# Patient Record
Sex: FEMALE | Race: WHITE | NOT HISPANIC OR LATINO | Employment: UNEMPLOYED | URBAN - METROPOLITAN AREA
[De-identification: names, ages, dates, MRNs, and addresses within clinical notes are randomized per-mention and may not be internally consistent; named-entity substitution may affect disease eponyms.]

---

## 2018-02-14 ENCOUNTER — HOSPITAL ENCOUNTER (EMERGENCY)
Facility: HOSPITAL | Age: 38
Discharge: HOME | End: 2018-02-14
Payer: MEDICAID

## 2018-02-14 VITALS
HEART RATE: 71 BPM | TEMPERATURE: 98 F | DIASTOLIC BLOOD PRESSURE: 70 MMHG | RESPIRATION RATE: 18 BRPM | SYSTOLIC BLOOD PRESSURE: 98 MMHG | OXYGEN SATURATION: 100 % | WEIGHT: 139 LBS

## 2018-02-14 DIAGNOSIS — R11.0 NAUSEA: ICD-10-CM

## 2018-02-14 DIAGNOSIS — R10.11 RUQ PAIN: Primary | ICD-10-CM

## 2018-02-14 LAB
ALBUMIN SERPL BCP-MCNC: 4.3 G/DL
ALP SERPL-CCNC: 69 U/L
ALT SERPL W/O P-5'-P-CCNC: 9 U/L
ANION GAP SERPL CALC-SCNC: 11 MMOL/L
AST SERPL-CCNC: 15 U/L
B-HCG UR QL: NEGATIVE
BASOPHILS # BLD AUTO: 0.02 K/UL
BASOPHILS NFR BLD: 0.3 %
BILIRUB SERPL-MCNC: 1.2 MG/DL
BILIRUB UR QL STRIP: NEGATIVE
BUN SERPL-MCNC: 7 MG/DL
CALCIUM SERPL-MCNC: 9.3 MG/DL
CHLORIDE SERPL-SCNC: 105 MMOL/L
CLARITY UR: CLEAR
CO2 SERPL-SCNC: 24 MMOL/L
COLOR UR: YELLOW
CREAT SERPL-MCNC: 0.8 MG/DL
DIFFERENTIAL METHOD: ABNORMAL
EOSINOPHIL # BLD AUTO: 0 K/UL
EOSINOPHIL NFR BLD: 0.6 %
ERYTHROCYTE [DISTWIDTH] IN BLOOD BY AUTOMATED COUNT: 14.7 %
EST. GFR  (AFRICAN AMERICAN): >60 ML/MIN/1.73 M^2
EST. GFR  (NON AFRICAN AMERICAN): >60 ML/MIN/1.73 M^2
GLUCOSE SERPL-MCNC: 104 MG/DL
GLUCOSE UR QL STRIP: NEGATIVE
HCT VFR BLD AUTO: 38.5 %
HGB BLD-MCNC: 12.8 G/DL
HGB UR QL STRIP: ABNORMAL
KETONES UR QL STRIP: NEGATIVE
LEUKOCYTE ESTERASE UR QL STRIP: NEGATIVE
LIPASE SERPL-CCNC: 17 U/L
LYMPHOCYTES # BLD AUTO: 2 K/UL
LYMPHOCYTES NFR BLD: 30.2 %
MCH RBC QN AUTO: 28.3 PG
MCHC RBC AUTO-ENTMCNC: 33.2 G/DL
MCV RBC AUTO: 85 FL
MICROSCOPIC COMMENT: ABNORMAL
MONOCYTES # BLD AUTO: 0.6 K/UL
MONOCYTES NFR BLD: 8.5 %
NEUTROPHILS # BLD AUTO: 3.9 K/UL
NEUTROPHILS NFR BLD: 60.4 %
NITRITE UR QL STRIP: NEGATIVE
PH UR STRIP: 6 [PH] (ref 5–8)
PLATELET # BLD AUTO: 340 K/UL
PMV BLD AUTO: 10.5 FL
POTASSIUM SERPL-SCNC: 3.7 MMOL/L
PROT SERPL-MCNC: 7.5 G/DL
PROT UR QL STRIP: NEGATIVE
RBC # BLD AUTO: 4.52 M/UL
RBC #/AREA URNS HPF: 20 /HPF (ref 0–4)
SODIUM SERPL-SCNC: 140 MMOL/L
SP GR UR STRIP: 1.01 (ref 1–1.03)
URN SPEC COLLECT METH UR: ABNORMAL
UROBILINOGEN UR STRIP-ACNC: NEGATIVE EU/DL
WBC # BLD AUTO: 6.46 K/UL
WBC #/AREA URNS HPF: 1 /HPF (ref 0–5)

## 2018-02-14 PROCEDURE — 99284 EMERGENCY DEPT VISIT MOD MDM: CPT | Mod: 25

## 2018-02-14 PROCEDURE — 81000 URINALYSIS NONAUTO W/SCOPE: CPT

## 2018-02-14 PROCEDURE — 85025 COMPLETE CBC W/AUTO DIFF WBC: CPT

## 2018-02-14 PROCEDURE — 81025 URINE PREGNANCY TEST: CPT

## 2018-02-14 PROCEDURE — 63600175 PHARM REV CODE 636 W HCPCS: Performed by: REGISTERED NURSE

## 2018-02-14 PROCEDURE — 80053 COMPREHEN METABOLIC PANEL: CPT

## 2018-02-14 PROCEDURE — 83690 ASSAY OF LIPASE: CPT

## 2018-02-14 PROCEDURE — 96375 TX/PRO/DX INJ NEW DRUG ADDON: CPT

## 2018-02-14 PROCEDURE — 96374 THER/PROPH/DIAG INJ IV PUSH: CPT

## 2018-02-14 RX ORDER — TRAMADOL HYDROCHLORIDE 50 MG/1
50 TABLET ORAL EVERY 6 HOURS PRN
Qty: 12 TABLET | Refills: 0 | Status: SHIPPED | OUTPATIENT
Start: 2018-02-14 | End: 2018-02-24

## 2018-02-14 RX ORDER — KETOROLAC TROMETHAMINE 30 MG/ML
30 INJECTION, SOLUTION INTRAMUSCULAR; INTRAVENOUS
Status: COMPLETED | OUTPATIENT
Start: 2018-02-14 | End: 2018-02-14

## 2018-02-14 RX ORDER — PROMETHAZINE HYDROCHLORIDE 25 MG/1
25 TABLET ORAL EVERY 6 HOURS PRN
Qty: 15 TABLET | Refills: 0 | Status: SHIPPED | OUTPATIENT
Start: 2018-02-14

## 2018-02-14 RX ORDER — ONDANSETRON 4 MG/1
4 TABLET, FILM COATED ORAL EVERY 6 HOURS
Qty: 12 TABLET | Refills: 0 | Status: SHIPPED | OUTPATIENT
Start: 2018-02-14

## 2018-02-14 RX ORDER — ONDANSETRON 2 MG/ML
4 INJECTION INTRAMUSCULAR; INTRAVENOUS
Status: COMPLETED | OUTPATIENT
Start: 2018-02-14 | End: 2018-02-14

## 2018-02-14 RX ADMIN — ONDANSETRON 4 MG: 2 INJECTION INTRAMUSCULAR; INTRAVENOUS at 12:02

## 2018-02-14 RX ADMIN — KETOROLAC TROMETHAMINE 30 MG: 30 INJECTION, SOLUTION INTRAMUSCULAR at 12:02

## 2018-02-14 NOTE — ED PROVIDER NOTES
History      Chief Complaint   Patient presents with    Abdominal Pain     RLQ pain started monday        Review of patient's allergies indicates:   Allergen Reactions    Morphine Swelling        HPI   HPI    2/14/2018, 11:45 AM   History obtained from the patient      History of Present Illness: Crissy Argueta is a 38 y.o. female patient who presents to the Emergency Department for RUQ pain that began 2 days ago. Symptoms are constant and moderate in severity. No mitigating or exacerbating factors reported. Associated sxs include nausea and poor appetite. Patient denies any vaginal discharge, fever, vomiting or diarrhea, CP, SOB, and all other sxs at this time. Prior Tx includes motrin with minimal effect. No further complaints or concerns at this time.         Arrival mode: Personal vehicle      PCP: Primary Doctor No       Past Medical History:  No past medical history on file.    Past Surgical History:  No past surgical history on file.      Family History:  No family history on file.    Social History:  Social History     Social History Main Topics    Smoking status: Not on file    Smokeless tobacco: Not on file    Alcohol use Not on file    Drug use: Unknown    Sexual activity: Not on file       ROS   Review of Systems   Constitutional: Negative for fever.   HENT: Negative for sore throat.    Respiratory: Negative for shortness of breath.    Cardiovascular: Negative for chest pain.   Gastrointestinal: Positive for abdominal pain and nausea.   Genitourinary: Negative for dysuria.   Musculoskeletal: Negative for back pain.   Skin: Negative for rash.   Neurological: Negative for weakness.   Hematological: Does not bruise/bleed easily.   All other systems reviewed and are negative.      Physical Exam      Initial Vitals [02/14/18 1124]   BP Pulse Resp Temp SpO2   (!) 144/71 88 20 98.3 °F (36.8 °C) 100 %      MAP       95.33          Physical Exam  Nursing Notes and Vital Signs Reviewed.  Constitutional:  Patient is in no acute distress. Well-developed and well-nourished.  Head: Atraumatic. Normocephalic.  Eyes: PERRL. EOM intact. Conjunctivae are not pale. No scleral icterus.  ENT: Mucous membranes are moist. Oropharynx is clear and symmetric.    Neck: Supple. Full ROM. No lymphadenopathy.  Cardiovascular: Regular rate. Regular rhythm. No murmurs, rubs, or gallops. Distal pulses are 2+ and symmetric.  Pulmonary/Chest: No respiratory distress. Clear to auscultation bilaterally. No wheezing or rales.  Abdominal: Soft and non-distended.  There is mild tenderness to RUQ.  No rebound, guarding, or rigidity. Good bowel sounds.  Genitourinary: No CVA tenderness  Musculoskeletal: Moves all extremities. No obvious deformities. No edema. No calf tenderness.  Skin: Warm and dry.  Neurological:  Alert, awake, and appropriate.  Normal speech.  No acute focal neurological deficits are appreciated.  Psychiatric: Normal affect. Good eye contact. Appropriate in content.    ED Course    Procedures  ED Vital Signs:  Vitals:    02/14/18 1124 02/14/18 1510   BP: (!) 144/71 98/70   Pulse: 88 71   Resp: 20 18   Temp: 98.3 °F (36.8 °C)    TempSrc: Oral    SpO2: 100% 100%   Weight: 63 kg (139 lb)        Abnormal Lab Results:  Labs Reviewed   CBC W/ AUTO DIFFERENTIAL - Abnormal; Notable for the following:        Result Value    RDW 14.7 (*)     All other components within normal limits   COMPREHENSIVE METABOLIC PANEL - Abnormal; Notable for the following:     Total Bilirubin 1.2 (*)     ALT 9 (*)     All other components within normal limits   URINALYSIS - Abnormal; Notable for the following:     Occult Blood UA 3+ (*)     All other components within normal limits   URINALYSIS MICROSCOPIC - Abnormal; Notable for the following:     RBC, UA 20 (*)     All other components within normal limits   LIPASE   PREGNANCY TEST, URINE RAPID        All Lab Results:      Imaging Results:  Imaging Results          CT Renal Stone Study ABD Pelvis WO (Final  result)  Result time 02/14/18 15:20:34    Final result by RAJESH Mckeon Sr., MD (02/14/18 15:20:34)                 Impression:      1. The Kidneys are normal in appearance.  2. The ureters and the urinary bladder are not well seen.   3. The appendix is not visualized. There no dilated loops of large or small bowel. The uterus and ovaries are not well seen. If additional imaging evaluation is clinically indicated, I recommend consideration of an ultrasound examination of the right lower quadrant of the abdomen and the pelvis.      All CT scans at this facility use dose modulation, iterative reconstruction, and/or weight base dosing when appropriate to reduce radiation dose when appropriate to reduce radiation dose to as low as reasonably achievable.      Electronically signed by: RAJESH MCKEON MD  Date:     02/14/18  Time:    15:20              Narrative:    CT of abdomen and pelvis without IV Contrast    History:     Flank pain, pregnant, stone disease suspected    Technique: Standard abdomen and pelvis CT protocol without oral or IV contrast was performed.    Finding: The size of the heart is within normal limits. The lungs are clear. There is no pneumothorax or pleural effusion.    The liver, gallbladder, pancreas, spleen, adrenals, and kidneys are normal in appearance. The ureters and the urinary bladder are not well seen. The appendix is not visualized. There no dilated loops of large or small bowel. The uterus and ovaries are not well seen. There is no significant amount of free fluid within the pelvis. There is no pneumoperitoneum.                             US Abdomen Limited (Final result)  Result time 02/14/18 14:25:00    Final result by Imer Gallego MD (02/14/18 14:25:00)                 Impression:     Negative gallbladder ultrasound.      Electronically signed by: IMER GALLEGO MD  Date:     02/14/18  Time:    14:25              Narrative:    Procedure: US ABDOMEN LIMITED, 02/14/18  14:20:59    Clinical history: RUQ pain ,  Abdominal pain.    The liver is unremarkable. The portal vein demonstrates a normal waveform. The gallbladder is empty. The common bile duct is normal at 4.8mm. The pancreas is normal.     The right kidney is normal at 10.1cm.                                      The Emergency Provider reviewed the vital signs and test results, which are outlined above.    ED Discussion     3:27 PM: Reassessed pt at this time.  Pt states her condition has improved at this time. Discussed with pt all pertinent ED information and results. Discussed pt dx and plan of tx. Gave pt all f/u and return to the ED instructions. All questions and concerns were addressed at this time. Pt expresses understanding of information and instructions, and is comfortable with plan to discharge. Pt is stable for discharge.        ED Medication(s):  Medications   ondansetron injection 4 mg (4 mg Intravenous Given 2/14/18 1211)   ketorolac injection 30 mg (30 mg Intravenous Given 2/14/18 1212)       Discharge Medication List as of 2/14/2018  2:34 PM      START taking these medications    Details   ondansetron (ZOFRAN) 4 MG tablet Take 1 tablet (4 mg total) by mouth every 6 (six) hours., Starting Wed 2/14/2018, Print      promethazine (PHENERGAN) 25 MG tablet Take 1 tablet (25 mg total) by mouth every 6 (six) hours as needed., Starting Wed 2/14/2018, Print      traMADol (ULTRAM) 50 mg tablet Take 1 tablet (50 mg total) by mouth every 6 (six) hours as needed., Starting Wed 2/14/2018, Until Sat 2/24/2018, Print             Follow-up Information     Primary Doctor No In 3 days.                   Medical Decision Making                Clinical Impression       ICD-10-CM ICD-9-CM   1. RUQ pain R10.11 789.01   2. Nausea R11.0 787.02               Mike Ellis Jr., FNP  02/14/18 3673

## 2023-11-03 ENCOUNTER — HOSPITAL ENCOUNTER (EMERGENCY)
Facility: HOSPITAL | Age: 43
Discharge: HOME | End: 2023-11-03
Attending: EMERGENCY MEDICINE
Payer: MEDICAID

## 2023-11-03 VITALS
HEIGHT: 67 IN | RESPIRATION RATE: 16 BRPM | TEMPERATURE: 99 F | DIASTOLIC BLOOD PRESSURE: 78 MMHG | OXYGEN SATURATION: 100 % | HEART RATE: 105 BPM | BODY MASS INDEX: 21.97 KG/M2 | WEIGHT: 140 LBS | SYSTOLIC BLOOD PRESSURE: 144 MMHG

## 2023-11-03 DIAGNOSIS — M54.9 BACK PAIN, UNSPECIFIED BACK LOCATION, UNSPECIFIED BACK PAIN LATERALITY, UNSPECIFIED CHRONICITY: Primary | ICD-10-CM

## 2023-11-03 LAB
BILIRUB UR QL STRIP: NEGATIVE
CLARITY UR: CLEAR
COLOR UR: COLORLESS
GLUCOSE UR QL STRIP: NEGATIVE
HCV AB SERPL QL IA: NEGATIVE
HEP C VIRUS HOLD SPECIMEN: NORMAL
HGB UR QL STRIP: NEGATIVE
HIV 1+2 AB+HIV1 P24 AG SERPL QL IA: NEGATIVE
KETONES UR QL STRIP: NEGATIVE
LEUKOCYTE ESTERASE UR QL STRIP: NEGATIVE
NITRITE UR QL STRIP: NEGATIVE
PH UR STRIP: 6 [PH] (ref 5–8)
PROT UR QL STRIP: NEGATIVE
SP GR UR STRIP: <1.005 (ref 1–1.03)
URN SPEC COLLECT METH UR: ABNORMAL
UROBILINOGEN UR STRIP-ACNC: NEGATIVE EU/DL

## 2023-11-03 PROCEDURE — 96372 THER/PROPH/DIAG INJ SC/IM: CPT | Performed by: NURSE PRACTITIONER

## 2023-11-03 PROCEDURE — 63600175 PHARM REV CODE 636 W HCPCS: Performed by: NURSE PRACTITIONER

## 2023-11-03 PROCEDURE — 86803 HEPATITIS C AB TEST: CPT | Performed by: EMERGENCY MEDICINE

## 2023-11-03 PROCEDURE — 87389 HIV-1 AG W/HIV-1&-2 AB AG IA: CPT | Performed by: EMERGENCY MEDICINE

## 2023-11-03 PROCEDURE — 99285 EMERGENCY DEPT VISIT HI MDM: CPT | Mod: 25

## 2023-11-03 PROCEDURE — 25000003 PHARM REV CODE 250: Performed by: NURSE PRACTITIONER

## 2023-11-03 PROCEDURE — 81003 URINALYSIS AUTO W/O SCOPE: CPT | Performed by: NURSE PRACTITIONER

## 2023-11-03 RX ORDER — PREDNISONE 50 MG/1
50 TABLET ORAL DAILY
Qty: 5 TABLET | Refills: 0 | Status: SHIPPED | OUTPATIENT
Start: 2023-11-03 | End: 2023-11-08

## 2023-11-03 RX ORDER — KETOROLAC TROMETHAMINE 10 MG/1
10 TABLET, FILM COATED ORAL EVERY 6 HOURS PRN
Qty: 15 TABLET | Refills: 0 | Status: SHIPPED | OUTPATIENT
Start: 2023-11-03

## 2023-11-03 RX ORDER — HYDROCODONE BITARTRATE AND ACETAMINOPHEN 10; 325 MG/1; MG/1
1 TABLET ORAL
Status: COMPLETED | OUTPATIENT
Start: 2023-11-03 | End: 2023-11-03

## 2023-11-03 RX ORDER — METAXALONE 800 MG/1
800 TABLET ORAL 3 TIMES DAILY PRN
Qty: 15 TABLET | Refills: 0 | Status: SHIPPED | OUTPATIENT
Start: 2023-11-03 | End: 2023-11-08

## 2023-11-03 RX ORDER — KETOROLAC TROMETHAMINE 30 MG/ML
30 INJECTION, SOLUTION INTRAMUSCULAR; INTRAVENOUS
Status: COMPLETED | OUTPATIENT
Start: 2023-11-03 | End: 2023-11-03

## 2023-11-03 RX ADMIN — KETOROLAC TROMETHAMINE 30 MG: 30 INJECTION, SOLUTION INTRAMUSCULAR; INTRAVENOUS at 02:11

## 2023-11-03 RX ADMIN — HYDROCODONE BITARTRATE AND ACETAMINOPHEN 1 TABLET: 10; 325 TABLET ORAL at 02:11

## 2023-11-03 NOTE — ED PROVIDER NOTES
Encounter Date: 11/3/2023       History     Chief Complaint   Patient presents with    Back Pain     Midline back pain onset Wednesday morning, position changes radiates around both sides and down inside of legs. No known injury recently, has chronic back pain.     43-year-old female with complaint of low back pain with radiation to bilateral lower abdomen since Wednesday.  Patient reports pain worse with any movement.  Patient reports certain she may have a kidney stone.  No fever or chills.        Review of patient's allergies indicates:   Allergen Reactions    Morphine Swelling     History reviewed. No pertinent past medical history.  History reviewed. No pertinent surgical history.  History reviewed. No pertinent family history.     Review of Systems   Constitutional:  Negative for fever.   HENT:  Negative for sore throat.    Respiratory:  Negative for shortness of breath.    Cardiovascular:  Negative for chest pain.   Gastrointestinal:  Negative for nausea.   Genitourinary:  Negative for dysuria.   Musculoskeletal:  Positive for back pain.   Skin:  Negative for rash.   Neurological:  Negative for weakness.   Hematological:  Does not bruise/bleed easily.       Physical Exam     Initial Vitals [11/03/23 1315]   BP Pulse Resp Temp SpO2   (!) 144/78 105 18 98.5 °F (36.9 °C) 100 %      MAP       --         Physical Exam    Nursing note and vitals reviewed.  Constitutional: She appears well-developed and well-nourished.   HENT:   Head: Normocephalic and atraumatic.   Eyes: Conjunctivae and EOM are normal. Pupils are equal, round, and reactive to light.   Neck: Neck supple.   Normal range of motion.  Cardiovascular:  Normal rate, regular rhythm, normal heart sounds and intact distal pulses.           Pulmonary/Chest: Breath sounds normal.   Abdominal: Abdomen is soft. There is no abdominal tenderness. There is no rebound and no guarding.   Musculoskeletal:         General: Normal range of motion.      Cervical back:  Normal range of motion and neck supple.      Comments: No spine tenderness, pain with ROM of lumbar spine     Neurological: She is alert and oriented to person, place, and time. She has normal strength and normal reflexes.   Skin: Skin is warm and dry.   Psychiatric: She has a normal mood and affect. Her behavior is normal. Thought content normal.         ED Course   Procedures  Labs Reviewed   URINALYSIS, REFLEX TO URINE CULTURE - Abnormal; Notable for the following components:       Result Value    Color, UA Colorless (*)     Specific Gravity, UA <1.005 (*)     All other components within normal limits    Narrative:     Specimen Source->Urine   HIV 1 / 2 ANTIBODY    Narrative:     Release to patient->Immediate   HEPATITIS C ANTIBODY    Narrative:     Release to patient->Immediate   HEP C VIRUS HOLD SPECIMEN    Narrative:     Release to patient->Immediate          Imaging Results              CT Renal Stone Study ABD Pelvis WO (Final result)  Result time 11/03/23 14:29:06      Final result by Pérez Brandon III, MD (11/03/23 14:29:06)                   Impression:      No acute abnormality.  Moderate stool burden.      Electronically signed by: Nico Brandon  Date:    11/03/2023  Time:    14:29               Narrative:    EXAMINATION:  CT RENAL STONE STUDY ABD PELVIS WO    CLINICAL HISTORY:  Flank pain, kidney stone suspected;    TECHNIQUE:  Low dose axial images, sagittal and coronal reformations were obtained from the lung bases to the pubic symphysis.  Contrast was not administered.    COMPARISON:  CT abdomen and pelvis 02/14/2018    FINDINGS:  No obstructive renal calculi or hydronephrosis.  Noncontrast evaluation of the liver, spleen, pancreas, gallbladder, adrenal glands and kidneys appear normal.  Moderate stool burden.  No diverticulosis or diverticulitis. No bowel obstruction, free air or adenopathy.  Physiologic free fluid within the posterior cul-de-sac. The uterus is retroverted. The  ovaries are unremarkable. The lung bases are within normal limits.                                    Imaging Results              CT Renal Stone Study ABD Pelvis WO (Final result)  Result time 11/03/23 14:29:06      Final result by Pérez Brandon III, MD (11/03/23 14:29:06)                   Impression:      No acute abnormality.  Moderate stool burden.      Electronically signed by: Nico Brandon  Date:    11/03/2023  Time:    14:29               Narrative:    EXAMINATION:  CT RENAL STONE STUDY ABD PELVIS WO    CLINICAL HISTORY:  Flank pain, kidney stone suspected;    TECHNIQUE:  Low dose axial images, sagittal and coronal reformations were obtained from the lung bases to the pubic symphysis.  Contrast was not administered.    COMPARISON:  CT abdomen and pelvis 02/14/2018    FINDINGS:  No obstructive renal calculi or hydronephrosis.  Noncontrast evaluation of the liver, spleen, pancreas, gallbladder, adrenal glands and kidneys appear normal.  Moderate stool burden.  No diverticulosis or diverticulitis. No bowel obstruction, free air or adenopathy.  Physiologic free fluid within the posterior cul-de-sac. The uterus is retroverted. The ovaries are unremarkable. The lung bases are within normal limits.                                         Medications   ketorolac injection 30 mg (30 mg Intramuscular Given 11/3/23 1407)   HYDROcodone-acetaminophen  mg per tablet 1 tablet (1 tablet Oral Given 11/3/23 1406)     Medical Decision Making  Amount and/or Complexity of Data Reviewed  Radiology: ordered.    Risk  Prescription drug management.      Labs Reviewed   URINALYSIS, REFLEX TO URINE CULTURE - Abnormal; Notable for the following components:       Result Value    Color, UA Colorless (*)     Specific Gravity, UA <1.005 (*)     All other components within normal limits    Narrative:     Specimen Source->Urine   HIV 1 / 2 ANTIBODY    Narrative:     Release to patient->Immediate   HEPATITIS C ANTIBODY     Narrative:     Release to patient->Immediate   HEP C VIRUS HOLD SPECIMEN    Narrative:     Release to patient->Immediate                                Clinical Impression:   Final diagnoses:  [M54.9] Back pain, unspecified back location, unspecified back pain laterality, unspecified chronicity (Primary)        ED Disposition Condition    Discharge Stable          ED Prescriptions       Medication Sig Dispense Start Date End Date Auth. Provider    ketorolac (TORADOL) 10 mg tablet Take 1 tablet (10 mg total) by mouth every 6 (six) hours as needed for Pain. 15 tablet 11/3/2023 -- Josue Alejandro NP    predniSONE (DELTASONE) 50 MG Tab Take 1 tablet (50 mg total) by mouth once daily. for 5 days 5 tablet 11/3/2023 11/8/2023 Josue Alejandro NP    metaxalone (SKELAXIN) 800 MG tablet Take 1 tablet (800 mg total) by mouth 3 (three) times daily as needed for Pain. 15 tablet 11/3/2023 11/8/2023 Josue Alejandro NP          Follow-up Information       Follow up With Specialties Details Why Contact Info    PCP  Schedule an appointment as soon as possible for a visit  As needed              Josue Alejandro NP  11/03/23 [id number]

## 2023-11-03 NOTE — ED NOTES
Bed: [facility] 09  Expected date:   Expected time:   Means of arrival:   Comments:   aching

## 2024-07-04 ENCOUNTER — HOSPITAL ENCOUNTER (EMERGENCY)
Facility: HOSPITAL | Age: 44
Discharge: HOME | End: 2024-07-04
Attending: FAMILY MEDICINE
Payer: MEDICAID

## 2024-07-04 VITALS
BODY MASS INDEX: 21.19 KG/M2 | RESPIRATION RATE: 16 BRPM | OXYGEN SATURATION: 100 % | TEMPERATURE: 98 F | HEART RATE: 68 BPM | WEIGHT: 135 LBS | DIASTOLIC BLOOD PRESSURE: 87 MMHG | SYSTOLIC BLOOD PRESSURE: 153 MMHG | HEIGHT: 67 IN

## 2024-07-04 DIAGNOSIS — M54.40 ACUTE LOW BACK PAIN WITH SCIATICA, SCIATICA LATERALITY UNSPECIFIED, UNSPECIFIED BACK PAIN LATERALITY: Primary | ICD-10-CM

## 2024-07-04 PROCEDURE — 63600175 PHARM REV CODE 636 W HCPCS: Performed by: NURSE PRACTITIONER

## 2024-07-04 PROCEDURE — 25000003 PHARM REV CODE 250: Performed by: NURSE PRACTITIONER

## 2024-07-04 PROCEDURE — 96372 THER/PROPH/DIAG INJ SC/IM: CPT | Performed by: NURSE PRACTITIONER

## 2024-07-04 PROCEDURE — 99284 EMERGENCY DEPT VISIT MOD MDM: CPT | Mod: 25

## 2024-07-04 RX ORDER — NAPROXEN 500 MG/1
500 TABLET ORAL 2 TIMES DAILY WITH MEALS
Qty: 20 TABLET | Refills: 0 | Status: SHIPPED | OUTPATIENT
Start: 2024-07-04 | End: 2024-07-14

## 2024-07-04 RX ORDER — METHOCARBAMOL 500 MG/1
500 TABLET, FILM COATED ORAL 4 TIMES DAILY PRN
Qty: 30 TABLET | Refills: 0 | Status: SHIPPED | OUTPATIENT
Start: 2024-07-04

## 2024-07-04 RX ORDER — HYDROCODONE BITARTRATE AND ACETAMINOPHEN 5; 325 MG/1; MG/1
1 TABLET ORAL EVERY 8 HOURS PRN
Qty: 12 TABLET | Refills: 0 | Status: SHIPPED | OUTPATIENT
Start: 2024-07-04

## 2024-07-04 RX ORDER — NAPROXEN 250 MG/1
250 TABLET ORAL
COMMUNITY
End: 2024-07-04

## 2024-07-04 RX ORDER — ALPRAZOLAM 2 MG/1
2 TABLET ORAL
COMMUNITY

## 2024-07-04 RX ORDER — KETOROLAC TROMETHAMINE 30 MG/ML
60 INJECTION, SOLUTION INTRAMUSCULAR; INTRAVENOUS
Status: COMPLETED | OUTPATIENT
Start: 2024-07-04 | End: 2024-07-04

## 2024-07-04 RX ORDER — ORPHENADRINE CITRATE 30 MG/ML
60 INJECTION INTRAMUSCULAR; INTRAVENOUS
Status: COMPLETED | OUTPATIENT
Start: 2024-07-04 | End: 2024-07-04

## 2024-07-04 RX ORDER — HYDROCODONE BITARTRATE AND ACETAMINOPHEN 5; 325 MG/1; MG/1
1 TABLET ORAL
Status: COMPLETED | OUTPATIENT
Start: 2024-07-04 | End: 2024-07-04

## 2024-07-04 RX ORDER — DEXAMETHASONE SODIUM PHOSPHATE 100 MG/10ML
10 INJECTION INTRAMUSCULAR; INTRAVENOUS
Status: COMPLETED | OUTPATIENT
Start: 2024-07-04 | End: 2024-07-04

## 2024-07-04 RX ADMIN — DEXAMETHASONE SODIUM PHOSPHATE 10 MG: 10 INJECTION INTRAMUSCULAR; INTRAVENOUS at 07:07

## 2024-07-04 RX ADMIN — ORPHENADRINE CITRATE 60 MG: 60 INJECTION INTRAMUSCULAR; INTRAVENOUS at 07:07

## 2024-07-04 RX ADMIN — KETOROLAC TROMETHAMINE 60 MG: 30 INJECTION, SOLUTION INTRAMUSCULAR; INTRAVENOUS at 07:07

## 2024-07-04 RX ADMIN — HYDROCODONE BITARTRATE AND ACETAMINOPHEN 1 TABLET: 5; 325 TABLET ORAL at 08:07

## 2024-07-05 NOTE — ED NOTES
Pt reports lower midline back pain x 2 days. Pt reports that she has been using otc treatments and ice packs for the pain. Pt reports hx of back injuries and pain. Pt reports she has not lifted anything heavy and has not fallen or had any new injury. Pt reports that her merrill buttocks feel heavy and that her legs are tingling. Pt ambulates with a steady gait and has full ROM to all extremities.
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
## 2024-10-13 ENCOUNTER — HOSPITAL ENCOUNTER (EMERGENCY)
Facility: HOSPITAL | Age: 44
Discharge: HOME | End: 2024-10-14
Attending: EMERGENCY MEDICINE
Payer: MEDICAID

## 2024-10-13 DIAGNOSIS — N93.8 DYSFUNCTIONAL UTERINE BLEEDING: Primary | ICD-10-CM

## 2024-10-13 LAB
ALBUMIN SERPL BCP-MCNC: 4.1 G/DL (ref 3.5–5.2)
ALP SERPL-CCNC: 54 U/L (ref 55–135)
ALT SERPL W/O P-5'-P-CCNC: 8 U/L (ref 10–44)
ANION GAP SERPL CALC-SCNC: 11 MMOL/L (ref 8–16)
AST SERPL-CCNC: 13 U/L (ref 10–40)
BASOPHILS # BLD AUTO: 0.03 K/UL (ref 0–0.2)
BASOPHILS NFR BLD: 0.6 % (ref 0–1.9)
BILIRUB SERPL-MCNC: 0.6 MG/DL (ref 0.1–1)
BUN SERPL-MCNC: 9 MG/DL (ref 6–20)
CALCIUM SERPL-MCNC: 9.2 MG/DL (ref 8.7–10.5)
CHLORIDE SERPL-SCNC: 107 MMOL/L (ref 95–110)
CO2 SERPL-SCNC: 24 MMOL/L (ref 23–29)
CREAT SERPL-MCNC: 0.9 MG/DL (ref 0.5–1.4)
DIFFERENTIAL METHOD BLD: ABNORMAL
EOSINOPHIL # BLD AUTO: 0.1 K/UL (ref 0–0.5)
EOSINOPHIL NFR BLD: 1.4 % (ref 0–8)
ERYTHROCYTE [DISTWIDTH] IN BLOOD BY AUTOMATED COUNT: 17.3 % (ref 11.5–14.5)
EST. GFR  (NO RACE VARIABLE): >60 ML/MIN/1.73 M^2
GLUCOSE SERPL-MCNC: 108 MG/DL (ref 70–110)
HCG INTACT+B SERPL-ACNC: <1.2 MIU/ML
HCT VFR BLD AUTO: 27.7 % (ref 37–48.5)
HGB BLD-MCNC: 8.3 G/DL (ref 12–16)
IMM GRANULOCYTES # BLD AUTO: 0.01 K/UL (ref 0–0.04)
IMM GRANULOCYTES NFR BLD AUTO: 0.2 % (ref 0–0.5)
LYMPHOCYTES # BLD AUTO: 1.3 K/UL (ref 1–4.8)
LYMPHOCYTES NFR BLD: 27 % (ref 18–48)
MCH RBC QN AUTO: 22.3 PG (ref 27–31)
MCHC RBC AUTO-ENTMCNC: 30 G/DL (ref 32–36)
MCV RBC AUTO: 75 FL (ref 82–98)
MONOCYTES # BLD AUTO: 0.6 K/UL (ref 0.3–1)
MONOCYTES NFR BLD: 11.5 % (ref 4–15)
NEUTROPHILS # BLD AUTO: 2.9 K/UL (ref 1.8–7.7)
NEUTROPHILS NFR BLD: 59.3 % (ref 38–73)
NRBC BLD-RTO: 0 /100 WBC
PLATELET # BLD AUTO: 373 K/UL (ref 150–450)
PMV BLD AUTO: 10.5 FL (ref 9.2–12.9)
POTASSIUM SERPL-SCNC: 3.8 MMOL/L (ref 3.5–5.1)
PROT SERPL-MCNC: 6.8 G/DL (ref 6–8.4)
RBC # BLD AUTO: 3.72 M/UL (ref 4–5.4)
SODIUM SERPL-SCNC: 142 MMOL/L (ref 136–145)
WBC # BLD AUTO: 4.86 K/UL (ref 3.9–12.7)

## 2024-10-13 PROCEDURE — 99283 EMERGENCY DEPT VISIT LOW MDM: CPT

## 2024-10-13 PROCEDURE — 84702 CHORIONIC GONADOTROPIN TEST: CPT | Performed by: EMERGENCY MEDICINE

## 2024-10-13 PROCEDURE — 80053 COMPREHEN METABOLIC PANEL: CPT | Performed by: EMERGENCY MEDICINE

## 2024-10-13 PROCEDURE — 85025 COMPLETE CBC W/AUTO DIFF WBC: CPT | Performed by: EMERGENCY MEDICINE

## 2024-10-14 ENCOUNTER — HOSPITAL ENCOUNTER (OUTPATIENT)
Facility: HOSPITAL | Age: 44
Discharge: HOME | End: 2024-10-16
Attending: EMERGENCY MEDICINE | Admitting: STUDENT IN AN ORGANIZED HEALTH CARE EDUCATION/TRAINING PROGRAM
Payer: MEDICAID

## 2024-10-14 VITALS
HEART RATE: 76 BPM | TEMPERATURE: 98 F | BODY MASS INDEX: 19.3 KG/M2 | WEIGHT: 123 LBS | HEIGHT: 67 IN | OXYGEN SATURATION: 100 % | DIASTOLIC BLOOD PRESSURE: 58 MMHG | SYSTOLIC BLOOD PRESSURE: 125 MMHG | RESPIRATION RATE: 18 BRPM

## 2024-10-14 DIAGNOSIS — D64.9 SYMPTOMATIC ANEMIA: ICD-10-CM

## 2024-10-14 DIAGNOSIS — D50.0 BLOOD LOSS ANEMIA: ICD-10-CM

## 2024-10-14 DIAGNOSIS — N93.9 ABNORMAL UTERINE BLEEDING: Primary | ICD-10-CM

## 2024-10-14 LAB
ABO + RH BLD: NORMAL
ALBUMIN SERPL BCP-MCNC: 4.4 G/DL (ref 3.5–5.2)
ALP SERPL-CCNC: 63 U/L (ref 55–135)
ALT SERPL W/O P-5'-P-CCNC: 7 U/L (ref 10–44)
ANION GAP SERPL CALC-SCNC: 9 MMOL/L (ref 8–16)
AST SERPL-CCNC: 15 U/L (ref 10–40)
B-HCG UR QL: NEGATIVE
BASOPHILS # BLD AUTO: 0.04 K/UL (ref 0–0.2)
BASOPHILS NFR BLD: 1 % (ref 0–1.9)
BILIRUB SERPL-MCNC: 0.7 MG/DL (ref 0.1–1)
BILIRUB UR QL STRIP: NEGATIVE
BLD GP AB SCN CELLS X3 SERPL QL: NORMAL
BLD PROD TYP BPU: NORMAL
BLOOD UNIT EXPIRATION DATE: NORMAL
BLOOD UNIT TYPE CODE: 6200
BLOOD UNIT TYPE: NORMAL
BUN SERPL-MCNC: 7 MG/DL (ref 6–20)
CALCIUM SERPL-MCNC: 9.8 MG/DL (ref 8.7–10.5)
CHLORIDE SERPL-SCNC: 108 MMOL/L (ref 95–110)
CLARITY UR: CLEAR
CO2 SERPL-SCNC: 24 MMOL/L (ref 23–29)
CODING SYSTEM: NORMAL
COLOR UR: COLORLESS
CREAT SERPL-MCNC: 0.9 MG/DL (ref 0.5–1.4)
CROSSMATCH INTERPRETATION: NORMAL
DIFFERENTIAL METHOD BLD: ABNORMAL
DISPENSE STATUS: NORMAL
EOSINOPHIL # BLD AUTO: 0.1 K/UL (ref 0–0.5)
EOSINOPHIL NFR BLD: 1.2 % (ref 0–8)
ERYTHROCYTE [DISTWIDTH] IN BLOOD BY AUTOMATED COUNT: 17.2 % (ref 11.5–14.5)
EST. GFR  (NO RACE VARIABLE): >60 ML/MIN/1.73 M^2
GLUCOSE SERPL-MCNC: 91 MG/DL (ref 70–110)
GLUCOSE UR QL STRIP: NEGATIVE
HCT VFR BLD AUTO: 28.4 % (ref 37–48.5)
HGB BLD-MCNC: 8.2 G/DL (ref 12–16)
HGB UR QL STRIP: NEGATIVE
IMM GRANULOCYTES # BLD AUTO: 0.01 K/UL (ref 0–0.04)
IMM GRANULOCYTES NFR BLD AUTO: 0.2 % (ref 0–0.5)
INR PPP: 1 (ref 0.8–1.2)
KETONES UR QL STRIP: NEGATIVE
LEUKOCYTE ESTERASE UR QL STRIP: NEGATIVE
LYMPHOCYTES # BLD AUTO: 1.2 K/UL (ref 1–4.8)
LYMPHOCYTES NFR BLD: 29.7 % (ref 18–48)
MCH RBC QN AUTO: 21.2 PG (ref 27–31)
MCHC RBC AUTO-ENTMCNC: 28.9 G/DL (ref 32–36)
MCV RBC AUTO: 74 FL (ref 82–98)
MONOCYTES # BLD AUTO: 0.4 K/UL (ref 0.3–1)
MONOCYTES NFR BLD: 9.4 % (ref 4–15)
NEUTROPHILS # BLD AUTO: 2.4 K/UL (ref 1.8–7.7)
NEUTROPHILS NFR BLD: 58.5 % (ref 38–73)
NITRITE UR QL STRIP: NEGATIVE
NRBC BLD-RTO: 0 /100 WBC
NUM UNITS TRANS PACKED RBC: NORMAL
PH UR STRIP: 6 [PH] (ref 5–8)
PLATELET # BLD AUTO: 386 K/UL (ref 150–450)
PMV BLD AUTO: 10.5 FL (ref 9.2–12.9)
POTASSIUM SERPL-SCNC: 3.9 MMOL/L (ref 3.5–5.1)
PROT SERPL-MCNC: 7.5 G/DL (ref 6–8.4)
PROT UR QL STRIP: NEGATIVE
PROTHROMBIN TIME: 11.3 SEC (ref 9–12.5)
RBC # BLD AUTO: 3.86 M/UL (ref 4–5.4)
SODIUM SERPL-SCNC: 141 MMOL/L (ref 136–145)
SP GR UR STRIP: 1.01 (ref 1–1.03)
SPECIMEN OUTDATE: NORMAL
URN SPEC COLLECT METH UR: ABNORMAL
UROBILINOGEN UR STRIP-ACNC: NEGATIVE EU/DL
WBC # BLD AUTO: 4.04 K/UL (ref 3.9–12.7)

## 2024-10-14 PROCEDURE — 36415 COLL VENOUS BLD VENIPUNCTURE: CPT

## 2024-10-14 PROCEDURE — 86920 COMPATIBILITY TEST SPIN: CPT | Performed by: EMERGENCY MEDICINE

## 2024-10-14 PROCEDURE — G0378 HOSPITAL OBSERVATION PER HR: HCPCS

## 2024-10-14 PROCEDURE — 86900 BLOOD TYPING SEROLOGIC ABO: CPT

## 2024-10-14 PROCEDURE — 81025 URINE PREGNANCY TEST: CPT | Performed by: EMERGENCY MEDICINE

## 2024-10-14 PROCEDURE — 25000003 PHARM REV CODE 250: Performed by: EMERGENCY MEDICINE

## 2024-10-14 PROCEDURE — 25000003 PHARM REV CODE 250: Performed by: STUDENT IN AN ORGANIZED HEALTH CARE EDUCATION/TRAINING PROGRAM

## 2024-10-14 PROCEDURE — 85610 PROTHROMBIN TIME: CPT

## 2024-10-14 PROCEDURE — 63600175 PHARM REV CODE 636 W HCPCS: Performed by: EMERGENCY MEDICINE

## 2024-10-14 PROCEDURE — 86850 RBC ANTIBODY SCREEN: CPT

## 2024-10-14 PROCEDURE — 81003 URINALYSIS AUTO W/O SCOPE: CPT | Performed by: EMERGENCY MEDICINE

## 2024-10-14 PROCEDURE — 85025 COMPLETE CBC W/AUTO DIFF WBC: CPT

## 2024-10-14 PROCEDURE — 63600175 PHARM REV CODE 636 W HCPCS: Performed by: STUDENT IN AN ORGANIZED HEALTH CARE EDUCATION/TRAINING PROGRAM

## 2024-10-14 PROCEDURE — 80053 COMPREHEN METABOLIC PANEL: CPT

## 2024-10-14 PROCEDURE — P9016 RBC LEUKOCYTES REDUCED: HCPCS | Performed by: EMERGENCY MEDICINE

## 2024-10-14 RX ORDER — ACETAMINOPHEN 325 MG/1
650 TABLET ORAL EVERY 8 HOURS PRN
Status: DISCONTINUED | OUTPATIENT
Start: 2024-10-14 | End: 2024-10-16 | Stop reason: HOSPADM

## 2024-10-14 RX ORDER — TALC
6 POWDER (GRAM) TOPICAL NIGHTLY PRN
Status: DISCONTINUED | OUTPATIENT
Start: 2024-10-14 | End: 2024-10-16 | Stop reason: HOSPADM

## 2024-10-14 RX ORDER — ONDANSETRON HYDROCHLORIDE 2 MG/ML
4 INJECTION, SOLUTION INTRAVENOUS
Status: COMPLETED | OUTPATIENT
Start: 2024-10-14 | End: 2024-10-14

## 2024-10-14 RX ORDER — PROCHLORPERAZINE MALEATE 5 MG
10 TABLET ORAL EVERY 6 HOURS PRN
Status: DISCONTINUED | OUTPATIENT
Start: 2024-10-14 | End: 2024-10-16 | Stop reason: HOSPADM

## 2024-10-14 RX ORDER — SODIUM CHLORIDE 0.9 % (FLUSH) 0.9 %
10 SYRINGE (ML) INJECTION
Status: DISCONTINUED | OUTPATIENT
Start: 2024-10-14 | End: 2024-10-16 | Stop reason: HOSPADM

## 2024-10-14 RX ORDER — HYDROCODONE BITARTRATE AND ACETAMINOPHEN 500; 5 MG/1; MG/1
TABLET ORAL
Status: DISCONTINUED | OUTPATIENT
Start: 2024-10-14 | End: 2024-10-15

## 2024-10-14 RX ORDER — ROPINIROLE 0.25 MG/1
0.5 TABLET, FILM COATED ORAL NIGHTLY
COMMUNITY
Start: 2024-10-09

## 2024-10-14 RX ORDER — AMOXICILLIN 250 MG
1 CAPSULE ORAL 2 TIMES DAILY
Status: DISCONTINUED | OUTPATIENT
Start: 2024-10-14 | End: 2024-10-16 | Stop reason: HOSPADM

## 2024-10-14 RX ORDER — MEDROXYPROGESTERONE ACETATE 5 MG/1
10 TABLET ORAL EVERY 8 HOURS
Status: DISCONTINUED | OUTPATIENT
Start: 2024-10-14 | End: 2024-10-15

## 2024-10-14 RX ORDER — ACETAMINOPHEN 500 MG
1000 TABLET ORAL
Status: COMPLETED | OUTPATIENT
Start: 2024-10-14 | End: 2024-10-14

## 2024-10-14 RX ADMIN — ACETAMINOPHEN 1000 MG: 500 TABLET ORAL at 06:10

## 2024-10-14 RX ADMIN — ONDANSETRON 4 MG: 2 INJECTION INTRAMUSCULAR; INTRAVENOUS at 05:10

## 2024-10-14 RX ADMIN — MEDROXYPROGESTERONE ACETATE 10 MG: 5 TABLET ORAL at 09:10

## 2024-10-14 RX ADMIN — PROCHLORPERAZINE MALEATE 10 MG: 5 TABLET ORAL at 10:10

## 2024-10-14 NOTE — ED PROVIDER NOTES
Encounter Date: 10/14/2024  SCRIBE #1 NOTE: I, Terrence Thompson, am scribing for, and in the presence of, Wojciech Mccoy MD. I have scribed the ED discussion and critical care procedure note.      History     Chief Complaint   Patient presents with    Vaginal Bleeding     Pt states she's been having vaginal bleeding for the past 3 weeks. Pt states she is having blurry vision, weakness, headache.     45 y/o F with PMH of sciatica here with c/o vaginal bleeding. This is constant, and has been worsening over the past 3 weeks. Today, she went through about 46 pads, and shows me pictures of adult diapers saturated with blood clots. She was seen by PCP 1 week ago, and Rx 650 mg TXA tablets, however was unable to hold them down as she has associated N/V. Went to ED last night in [place], [place], but did not transfer to [place], [place] as they recommended. She is having associated confusion/fogginess and fell 3 times today due to weakness. 20# wt loss in the past 1 years, unintentional. Pelvic pain as well.     The history is provided by the patient.     Review of patient's allergies indicates:   Allergen Reactions    Morphine Swelling and Hives    Tramadol Other (See Comments)     headache     Past Medical History:   Diagnosis Date    Anxiety disorder, unspecified     Back injury     Rheumatoid arthritis, unspecified     Sciatica      History reviewed. No pertinent surgical history.  No family history on file.  Social History     Tobacco Use    Smoking status: Never    Smokeless tobacco: Never   Substance Use Topics    Alcohol use: Never    Drug use: Never     Review of Systems   Constitutional:  Positive for fatigue and unexpected weight change. Negative for diaphoresis and fever.   HENT:  Negative for congestion, dental problem and sore throat.    Eyes:  Negative for pain and visual disturbance.   Respiratory:  Negative for cough and shortness of breath.    Cardiovascular:  Negative for chest pain and palpitations.    Gastrointestinal:  Positive for abdominal pain, nausea and vomiting. Negative for diarrhea.   Genitourinary:  Positive for pelvic pain and vaginal bleeding. Negative for dysuria and flank pain.   Musculoskeletal:  Negative for back pain and neck pain.   Skin:  Negative for rash and wound.   Neurological:  Positive for weakness. Negative for numbness and headaches.   Psychiatric/Behavioral:  Positive for confusion. Negative for agitation.        Physical Exam     Initial Vitals [10/14/24 1415]   BP Pulse Resp Temp SpO2   (!) 157/65 78 16 97.7 °F (36.5 °C) 100 %      MAP       --         Physical Exam    Constitutional: She appears well-developed and well-nourished.   HENT:   Head: Normocephalic and atraumatic.   Eyes: EOM are normal. Pupils are equal, round, and reactive to light.   Neck: Neck supple.   Normal range of motion.  Cardiovascular:  Normal rate and regular rhythm.           Pulmonary/Chest: Breath sounds normal. No respiratory distress.   Abdominal: She exhibits no distension. There is abdominal tenderness.   SP tenderness to palpation.    Genitourinary:    Genitourinary Comments: Pelvic Exam: Female chaperone present. Dark red blood in vaginal vault.      Musculoskeletal:      Cervical back: Normal range of motion and neck supple.     Neurological: She is alert and oriented to person, place, and time. She has normal strength. No sensory deficit.   Skin: Skin is warm and dry. There is pallor.   Psychiatric: She has a normal mood and affect.         ED Course   Critical Care    Date/Time: 10/14/2024 6:49 PM    Performed by: Wojciech Mccoy MD  Authorized by: Wojciech Mccoy MD  Direct patient critical care time: 15 minutes  Additional history critical care time: 8 minutes  Ordering / reviewing critical care time: 7 minutes  Documentation critical care time: 8 minutes  Consulting other physicians critical care time: 6 minutes  Total critical care time (exclusive of procedural time) : 44 minutes  Critical  care time was exclusive of teaching time and separately billable procedures and treating other patients.  Critical care was necessary to treat or prevent imminent or life-threatening deterioration of the following conditions: Anemia.  Critical care was time spent personally by me on the following activities: blood draw for specimens, development of treatment plan with patient or surrogate, discussions with consultants, interpretation of cardiac output measurements, evaluation of patient's response to treatment, ordering and review of laboratory studies, ordering and performing treatments and interventions, obtaining history from patient or surrogate, examination of patient, pulse oximetry, ordering and review of radiographic studies, re-evaluation of patient's condition and review of old charts.        Labs Reviewed   CBC W/ AUTO DIFFERENTIAL - Abnormal       Result Value    WBC 4.04      RBC 3.86 (*)     Hemoglobin 8.2 (*)     Hematocrit 28.4 (*)     MCV 74 (*)     MCH 21.2 (*)     MCHC 28.9 (*)     RDW 17.2 (*)     Platelets 386      MPV 10.5      Immature Granulocytes 0.2      Gran # (ANC) 2.4      Immature Grans (Abs) 0.01      Lymph # 1.2      Mono # 0.4      Eos # 0.1      Baso # 0.04      nRBC 0      Gran % 58.5      Lymph % 29.7      Mono % 9.4      Eosinophil % 1.2      Basophil % 1.0      Differential Method Automated     COMPREHENSIVE METABOLIC PANEL - Abnormal    Sodium 141      Potassium 3.9      Chloride 108      CO2 24      Glucose 91      BUN 7      Creatinine 0.9      Calcium 9.8      Total Protein 7.5      Albumin 4.4      Total Bilirubin 0.7      Alkaline Phosphatase 63      AST 15      ALT 7 (*)     eGFR >60      Anion Gap 9     URINALYSIS, REFLEX TO URINE CULTURE - Abnormal    Specimen UA Urine, Clean Catch      Color, UA Colorless (*)     Appearance, UA Clear      pH, UA 6.0      Specific Gravity, UA 1.010      Protein, UA Negative      Glucose, UA Negative      Ketones, UA Negative       Bilirubin (UA) Negative      Occult Blood UA Negative      Nitrite, UA Negative      Urobilinogen, UA Negative      Leukocytes, UA Negative      Narrative:     Specimen Source->Urine   PROTIME-INR    Prothrombin Time 11.3      INR 1.0     PREGNANCY TEST, URINE RAPID    Preg Test, Ur Negative      Narrative:     Specimen Source->Urine   TYPE & SCREEN    Group & Rh A POS      Indirect [place] NEG      Specimen Outdate 10/17/2024 23:59     PREPARE RBC SOFT    UNIT NUMBER [id number]      Product Code [id number]      DISPENSE STATUS TRANSFUSED      CODING SYSTEM [id number]      Unit Blood Type Code 6200      Unit Blood Type A POS      Unit Expiration [id number]      CROSSMATCH INTERPRETATION Compatible            Imaging Results              US Pelvis Comp with Transvag NON-OB (xpd) (Final result)  Result time 10/14/24 17:47:58   Procedure changed from US Pelvis Complete Non OB     Final result by Teetee Anguiano MD (10/14/24 17:47:58)                   Impression:      Questionable hydrosalpinx small to moderate caliber      Electronically signed by: Teetee Anguiano  Date:    10/14/2024  Time:    17:47               Narrative:    EXAMINATION:  US PELVIS COMP WITH TRANSVAG NON-OB (XPD)    CLINICAL HISTORY:  Vaginal bleeding;    TECHNIQUE:  Pelvic sonogram prior CT comparison    COMPARISON:  Prior CT comparison    FINDINGS:  uterus retroflexed or retroverted length measurement 12 cm.  Endometrial stripe thickness measurements 16 mm.  Questionable hydrosalpinx bilateral.  Ovaries unremarkable with small cysts likely physiologic.  Technique is transabdominal and transvaginal                                       Medications   0.9%  NaCl infusion (for blood administration) (has no administration in time range)   medroxyPROGESTERone tablet 10 mg (10 mg Oral Given 10/14/24 8566)   sodium chloride 0.9% flush 10 mL (has no administration in time range)   melatonin tablet 6 mg (has no administration in time range)    acetaminophen tablet 650 mg (has no administration in time range)   senna-docusate 8.6-50 mg per tablet 1 tablet (1 tablet Oral Not Given 10/14/24 2100)   prochlorperazine tablet 10 mg (10 mg Oral Given 10/14/24 2200)   ondansetron injection 4 mg (4 mg Intravenous Given 10/14/24 1722)   acetaminophen tablet 1,000 mg (1,000 mg Oral Given 10/14/24 1803)       ED Discussion:     6:55 PM: Spoke with Sarah Garduno MD (OBGYN) via secure chat who recommends giving pt a blood transfusion and admit for obs over night. Recommends holding off provera for now. Dr. Garduno will come see the pt at bedside to evaluate.     7:05 PM: Discussed case with Dr. Garduno (OBGYN). Dr. Garduno agrees with current care and management of pt and accepts admission.   Admitting Service: OBGYN  Admitting Physician: Dr. Garduno  Admit to: OBS     7:05 PM: Re-evaluated pt. I have discussed test results, shared treatment plan, and the need for admission with patient and family at bedside. Pt and family express understanding at this time and agree with all information. All questions answered. Pt and family have no further questions or concerns at this time. Pt is ready for admit.         Medical Decision Making  DDx includes anemia, dysfunctional uterine bleeding, pregnancy    Amount and/or Complexity of Data Reviewed  Labs: ordered. Decision-making details documented in ED Course.  Radiology: ordered and independent interpretation performed. Decision-making details documented in ED Course.    Risk  OTC drugs.  Prescription drug management.               ED Course as of 10/15/24 0153   Mon Oct 14, 2024   1839 43 y/o F with vaginal bleeding over the past 3 weeks, it has been worsening. She went through 46 adult diapers today, and has near constant blood clots. On my exam, she has a moderate amount of dark blood with clots in the vault, but no other lesions/masses/lacerations. She is tender. US showing hydrosalpinx. Pregnancy test is negative. Hgb is 8,  down from 12 last year.  She is symptomatic, feeling lightheaded. She was Rx TXA 1 week ago by PCP, but was vomiting them up. She was seen last night in ER in [place], [place], but when they recommended transport for further workup, she left and came here. Awaiting GYN recs.  [BA]      ED Course User Index  [BA] Wojciech Mccoy MD                       Disposition:   Disposition: Placed in Observation (OBGYN)  Condition: Stable       Clinical Impression:  Final diagnoses:  [N93.9] Abnormal uterine bleeding (Primary)  [D64.9] Symptomatic anemia       Scribe Attestation:   Scribe #1: I performed the above scribed service and the documentation accurately describes the services I performed. I attest to the accuracy of the note.     Attending:   Physician Attestation Statement for Scribe #1: I, Wojciech Mccoy MD, personally performed the services described in this documentation, as scribed by Terrence Thompson, in my presence, and it is both accurate and complete.    ED Disposition Condition    Observation Stable                Wojciech Mccoy MD  10/15/24 0154

## 2024-10-14 NOTE — Clinical Note
Diagnosis: Abnormal uterine bleeding [709569]   Future Attending Provider: BELLA LOGAN [[id number]]   Special Needs:: No Special Needs [1]

## 2024-10-14 NOTE — FIRST PROVIDER EVALUATION
"Medical screening examination initiated.  I have conducted a focused provider triage encounter, findings are as follows:    Brief history of present illness:  44-year-old female presents to the emergency department chief complaint of vaginal bleeding.  Reports she has been bleeding heavily and passing clots for 3 weeks.  Reports that she was seen yesterday in [place] after an episode of syncope, and wanted to transfer her to [place], reports she left against medical advice.  Reports that she is having dizziness, lightheadedness, headache.     Vitals:    10/14/24 1415   BP: (!) 157/65   BP Location: Right arm   Patient Position: Sitting   Pulse: 78   Resp: 16   Temp: 97.7 °F (36.5 °C)   TempSrc: Oral   SpO2: 100%   Weight: 58 kg (127 lb 13.9 oz)   Height: 5' 7" (1.702 m)       Pertinent physical exam:  Lightheaded, dizziness, pallor    Brief workup plan:  Workup    Preliminary workup initiated; this workup will be continued and followed by the physician or advanced practice provider that is assigned to the patient when roomed.  "

## 2024-10-14 NOTE — DISCHARGE INSTRUCTIONS
Follow up with the Ob/gyn for evaluation of her vaginal bleeding.  Return to the emergency room if any further problems.

## 2024-10-14 NOTE — ED PROVIDER NOTES
Encounter Date: 10/13/2024       History     Chief Complaint   Patient presents with    Vaginal Bleeding     Vaginal bleeding with dark red and bright red blood. Pt reports she saw her PCP on 10/9/2024 and was given Tranexamic for the bleeding however pt reports she can't tolerate them and vomits. Pt reports the bleeding is getting heavier. Pt reports she's having a headache, dizziness, lower back pain.     Abdominal Pain     Abd cramping.     Patient presents to the emergency department for evaluation of vaginal bleeding for the last 3 weeks.  Patient states she started her period as normal 3 weeks ago but it has not stopped.  She was seen by her primary care provider 5 days ago and was placed on medication to try to stop the bleeding but she states it made her throw up so she quit taking it.  She has not seen her primary care provider since.  She denies abdominal pain.  She denies any chest pain or palpitations.  She denies any other complaints.    The history is provided by the patient.     Review of patient's allergies indicates:   Allergen Reactions    Morphine Swelling and Hives    Tramadol Other (See Comments)     headache     Past Medical History:   Diagnosis Date    Anxiety disorder, unspecified     Back injury     Rheumatoid arthritis, unspecified     Sciatica      History reviewed. No pertinent surgical history.  No family history on file.  Social History     Tobacco Use    Smoking status: Never    Smokeless tobacco: Never   Substance Use Topics    Alcohol use: Never    Drug use: Never     Review of Systems   Constitutional:  Negative for activity change, appetite change, chills and fever.   HENT:  Negative for congestion, ear discharge, ear pain, nosebleeds, sore throat and voice change.    Eyes:  Negative for photophobia, pain and discharge.   Respiratory:  Negative for cough, chest tightness, shortness of breath and wheezing.    Cardiovascular:  Negative for chest pain and palpitations.    Gastrointestinal:  Negative for abdominal pain, constipation, nausea and vomiting.   Genitourinary:  Positive for vaginal bleeding. Negative for dysuria, flank pain, frequency and urgency.   Musculoskeletal:  Negative for back pain and neck pain.   Skin:  Negative for rash and wound.   Neurological:  Negative for dizziness, seizures, weakness and headaches.       Physical Exam     Initial Vitals [10/13/24 2111]   BP Pulse Resp Temp SpO2   131/79 81 20 97.5 °F (36.4 °C) 100 %      MAP       --         Physical Exam    Nursing note and vitals reviewed.  Constitutional: She appears well-developed and well-nourished.   HENT:   Head: Normocephalic and atraumatic.   Right Ear: External ear normal.   Left Ear: External ear normal.   Nose: Nose normal. Mouth/Throat: Oropharynx is clear and moist.   Eyes: Conjunctivae and EOM are normal. Pupils are equal, round, and reactive to light.   Neck: Neck supple. No tracheal deviation present.   Normal range of motion.  Cardiovascular:  Normal rate, regular rhythm and normal heart sounds.           Pulmonary/Chest: Breath sounds normal. She has no wheezes.   Abdominal: Abdomen is soft. Bowel sounds are normal. There is no abdominal tenderness.   Musculoskeletal:         General: No tenderness. Normal range of motion.      Cervical back: Normal range of motion and neck supple.     Neurological: She is alert and oriented to person, place, and time. She has normal reflexes.   Skin: Skin is warm and dry. Capillary refill takes less than 2 seconds. No rash noted.         ED Course   Procedures  Labs Reviewed   CBC W/ AUTO DIFFERENTIAL - Abnormal       Result Value    WBC 4.86      RBC 3.72 (*)     Hemoglobin 8.3 (*)     Hematocrit 27.7 (*)     MCV 75 (*)     MCH 22.3 (*)     MCHC 30.0 (*)     RDW 17.3 (*)     Platelets 373      MPV 10.5      Immature Granulocytes 0.2      Gran # (ANC) 2.9      Immature Grans (Abs) 0.01      Lymph # 1.3      Mono # 0.6      Eos # 0.1      Baso # 0.03       nRBC 0      Gran % 59.3      Lymph % 27.0      Mono % 11.5      Eosinophil % 1.4      Basophil % 0.6      Differential Method Automated     COMPREHENSIVE METABOLIC PANEL - Abnormal    Sodium 142      Potassium 3.8      Chloride 107      CO2 24      Glucose 108      BUN 9      Creatinine 0.9      Calcium 9.2      Total Protein 6.8      Albumin 4.1      Total Bilirubin 0.6      Alkaline Phosphatase 54 (*)     AST 13      ALT 8 (*)     eGFR >60.0      Anion Gap 11     HCG, QUANTITATIVE    HCG Quant <1.2     URINALYSIS, REFLEX TO URINE CULTURE   PREGNANCY TEST, URINE RAPID          Imaging Results    None          Medications - No data to display  Medical Decision Making  History is obtained from the patient.  All labs were reviewed by myself.  Differential diagnosis includes, but is not limited to, dysfunctional uterine bleeding//ectopic pregnancy  Patient has no limitation to healthcare axis.    Patient has a negative pregnancy test.  Her blood count is 8.3 and 27.7.  Vital signs are stable with the patient is not tachycardic.  We will discharge patient home to follow up with obstetrician.    Amount and/or Complexity of Data Reviewed  Labs: ordered.                                      Clinical Impression:  Final diagnoses:  [N93.8] Dysfunctional uterine bleeding (Primary)          ED Disposition Condition    Discharge Stable          ED Prescriptions    None       Follow-up Information       Follow up With Specialties Details Why Contact Info    Gynecologist of choice  Schedule an appointment as soon as possible for a visit                Nico Perez,   10/13/24 1189

## 2024-10-14 NOTE — ED NOTES
Wheel chair offered, pt refusing, reports she is ok walking, made aware, she is a fall risk, pt continues to wheel chair

## 2024-10-14 NOTE — ED NOTES
Pt is requesting to speak to ER doctor, dr. Perez made aware and comes to bedside, currently answering all pt's questions

## 2024-10-14 NOTE — ED TRIAGE NOTES
"Present with c/o "hemorrhaging" reports vaginal bleeding since 3 wks, pt reports she was seen by a primary care doctor and prescribed Tranexamic, pt reports " but every time I threw it up" pt reports she vomited the pill every time she took them, pt states " I never kept them down"     Pt's visit with primary care was on 10/09/24,     Currently c/o " dizzy headache, weak, the hemorrhaging, soaking 3 pads, I can put three and soak them" reports saturating 1 pad an hour    Reports lower back pain 5/10  "

## 2024-10-15 ENCOUNTER — ANESTHESIA (OUTPATIENT)
Dept: SURGERY | Facility: HOSPITAL | Age: 44
End: 2024-10-15
Payer: MEDICAID

## 2024-10-15 ENCOUNTER — ANESTHESIA EVENT (OUTPATIENT)
Dept: SURGERY | Facility: HOSPITAL | Age: 44
End: 2024-10-15
Payer: MEDICAID

## 2024-10-15 LAB
BASOPHILS # BLD AUTO: 0.07 K/UL (ref 0–0.2)
BASOPHILS NFR BLD: 1.1 % (ref 0–1.9)
DIFFERENTIAL METHOD BLD: ABNORMAL
EOSINOPHIL # BLD AUTO: 0.2 K/UL (ref 0–0.5)
EOSINOPHIL NFR BLD: 2.6 % (ref 0–8)
ERYTHROCYTE [DISTWIDTH] IN BLOOD BY AUTOMATED COUNT: 16.6 % (ref 11.5–14.5)
HCT VFR BLD AUTO: 29 % (ref 37–48.5)
HGB BLD-MCNC: 8.7 G/DL (ref 12–16)
IMM GRANULOCYTES # BLD AUTO: 0.01 K/UL (ref 0–0.04)
IMM GRANULOCYTES NFR BLD AUTO: 0.2 % (ref 0–0.5)
LYMPHOCYTES # BLD AUTO: 1.9 K/UL (ref 1–4.8)
LYMPHOCYTES NFR BLD: 31.5 % (ref 18–48)
MCH RBC QN AUTO: 22.8 PG (ref 27–31)
MCHC RBC AUTO-ENTMCNC: 30 G/DL (ref 32–36)
MCV RBC AUTO: 76 FL (ref 82–98)
MONOCYTES # BLD AUTO: 0.7 K/UL (ref 0.3–1)
MONOCYTES NFR BLD: 11.6 % (ref 4–15)
NEUTROPHILS # BLD AUTO: 3.3 K/UL (ref 1.8–7.7)
NEUTROPHILS NFR BLD: 53 % (ref 38–73)
NRBC BLD-RTO: 0 /100 WBC
PLATELET # BLD AUTO: 329 K/UL (ref 150–450)
PMV BLD AUTO: 11 FL (ref 9.2–12.9)
RBC # BLD AUTO: 3.82 M/UL (ref 4–5.4)
WBC # BLD AUTO: 6.13 K/UL (ref 3.9–12.7)

## 2024-10-15 PROCEDURE — 63600175 PHARM REV CODE 636 W HCPCS: Performed by: NURSE ANESTHETIST, CERTIFIED REGISTERED

## 2024-10-15 PROCEDURE — 37000009 HC ANESTHESIA EA ADD 15 MINS: Performed by: OBSTETRICS & GYNECOLOGY

## 2024-10-15 PROCEDURE — 36000706: Performed by: OBSTETRICS & GYNECOLOGY

## 2024-10-15 PROCEDURE — 36000707: Performed by: OBSTETRICS & GYNECOLOGY

## 2024-10-15 PROCEDURE — 88305 TISSUE EXAM BY PATHOLOGIST: CPT | Performed by: PATHOLOGY

## 2024-10-15 PROCEDURE — 36415 COLL VENOUS BLD VENIPUNCTURE: CPT | Performed by: STUDENT IN AN ORGANIZED HEALTH CARE EDUCATION/TRAINING PROGRAM

## 2024-10-15 PROCEDURE — 37000008 HC ANESTHESIA 1ST 15 MINUTES: Performed by: OBSTETRICS & GYNECOLOGY

## 2024-10-15 PROCEDURE — 63600175 PHARM REV CODE 636 W HCPCS: Performed by: OBSTETRICS & GYNECOLOGY

## 2024-10-15 PROCEDURE — 57135 EXCISION VAGINAL CYST/TUMOR: CPT | Mod: ,,, | Performed by: OBSTETRICS & GYNECOLOGY

## 2024-10-15 PROCEDURE — 25000003 PHARM REV CODE 250: Performed by: OBSTETRICS & GYNECOLOGY

## 2024-10-15 PROCEDURE — 58558 HYSTEROSCOPY BIOPSY: CPT | Mod: ,,, | Performed by: OBSTETRICS & GYNECOLOGY

## 2024-10-15 PROCEDURE — 63600175 PHARM REV CODE 636 W HCPCS: Performed by: ANESTHESIOLOGY

## 2024-10-15 PROCEDURE — 71000033 HC RECOVERY, INTIAL HOUR: Performed by: OBSTETRICS & GYNECOLOGY

## 2024-10-15 PROCEDURE — G0378 HOSPITAL OBSERVATION PER HR: HCPCS

## 2024-10-15 PROCEDURE — 25000003 PHARM REV CODE 250: Performed by: STUDENT IN AN ORGANIZED HEALTH CARE EDUCATION/TRAINING PROGRAM

## 2024-10-15 PROCEDURE — 85025 COMPLETE CBC W/AUTO DIFF WBC: CPT | Performed by: STUDENT IN AN ORGANIZED HEALTH CARE EDUCATION/TRAINING PROGRAM

## 2024-10-15 PROCEDURE — 88304 TISSUE EXAM BY PATHOLOGIST: CPT | Performed by: PATHOLOGY

## 2024-10-15 PROCEDURE — 27200651 HC AIRWAY, LMA: Performed by: ANESTHESIOLOGY

## 2024-10-15 RX ORDER — SODIUM CHLORIDE, SODIUM LACTATE, POTASSIUM CHLORIDE, CALCIUM CHLORIDE 600; 310; 30; 20 MG/100ML; MG/100ML; MG/100ML; MG/100ML
INJECTION, SOLUTION INTRAVENOUS CONTINUOUS PRN
Status: DISCONTINUED | OUTPATIENT
Start: 2024-10-15 | End: 2024-10-15

## 2024-10-15 RX ORDER — MIDAZOLAM HYDROCHLORIDE 1 MG/ML
INJECTION INTRAMUSCULAR; INTRAVENOUS
Status: DISCONTINUED | OUTPATIENT
Start: 2024-10-15 | End: 2024-10-15

## 2024-10-15 RX ORDER — FAMOTIDINE 20 MG/1
20 TABLET, FILM COATED ORAL
Status: DISCONTINUED | OUTPATIENT
Start: 2024-10-15 | End: 2024-10-15 | Stop reason: HOSPADM

## 2024-10-15 RX ORDER — FENTANYL CITRATE 50 UG/ML
INJECTION, SOLUTION INTRAMUSCULAR; INTRAVENOUS
Status: DISCONTINUED | OUTPATIENT
Start: 2024-10-15 | End: 2024-10-15

## 2024-10-15 RX ORDER — HYDROMORPHONE HYDROCHLORIDE 1 MG/ML
0.2 INJECTION, SOLUTION INTRAMUSCULAR; INTRAVENOUS; SUBCUTANEOUS EVERY 5 MIN PRN
Status: DISCONTINUED | OUTPATIENT
Start: 2024-10-15 | End: 2024-10-15

## 2024-10-15 RX ORDER — IBUPROFEN 800 MG/1
800 TABLET ORAL EVERY 6 HOURS
Status: DISCONTINUED | OUTPATIENT
Start: 2024-10-15 | End: 2024-10-16 | Stop reason: HOSPADM

## 2024-10-15 RX ORDER — PROPOFOL 10 MG/ML
VIAL (ML) INTRAVENOUS
Status: DISCONTINUED | OUTPATIENT
Start: 2024-10-15 | End: 2024-10-15

## 2024-10-15 RX ORDER — HYDROMORPHONE HYDROCHLORIDE 2 MG/ML
0.2 INJECTION, SOLUTION INTRAMUSCULAR; INTRAVENOUS; SUBCUTANEOUS EVERY 5 MIN PRN
Status: DISCONTINUED | OUTPATIENT
Start: 2024-10-15 | End: 2024-10-15 | Stop reason: HOSPADM

## 2024-10-15 RX ORDER — LIDOCAINE HYDROCHLORIDE 20 MG/ML
INJECTION INTRAVENOUS
Status: DISCONTINUED | OUTPATIENT
Start: 2024-10-15 | End: 2024-10-15

## 2024-10-15 RX ORDER — ONDANSETRON HYDROCHLORIDE 2 MG/ML
INJECTION, SOLUTION INTRAVENOUS
Status: DISCONTINUED | OUTPATIENT
Start: 2024-10-15 | End: 2024-10-15

## 2024-10-15 RX ORDER — ONDANSETRON HYDROCHLORIDE 2 MG/ML
4 INJECTION, SOLUTION INTRAVENOUS DAILY PRN
Status: DISCONTINUED | OUTPATIENT
Start: 2024-10-15 | End: 2024-10-15 | Stop reason: HOSPADM

## 2024-10-15 RX ORDER — KETOROLAC TROMETHAMINE 30 MG/ML
15 INJECTION, SOLUTION INTRAMUSCULAR; INTRAVENOUS EVERY 8 HOURS PRN
Status: DISCONTINUED | OUTPATIENT
Start: 2024-10-15 | End: 2024-10-15 | Stop reason: HOSPADM

## 2024-10-15 RX ORDER — SODIUM CHLORIDE, SODIUM LACTATE, POTASSIUM CHLORIDE, CALCIUM CHLORIDE 600; 310; 30; 20 MG/100ML; MG/100ML; MG/100ML; MG/100ML
INJECTION, SOLUTION INTRAVENOUS CONTINUOUS
Status: DISCONTINUED | OUTPATIENT
Start: 2024-10-15 | End: 2024-10-15

## 2024-10-15 RX ADMIN — HYDROMORPHONE HYDROCHLORIDE 0.2 MG: 2 INJECTION, SOLUTION INTRAMUSCULAR; INTRAVENOUS; SUBCUTANEOUS at 04:10

## 2024-10-15 RX ADMIN — ONDANSETRON 4 MG: 2 INJECTION INTRAMUSCULAR; INTRAVENOUS at 04:10

## 2024-10-15 RX ADMIN — ONDANSETRON 4 MG: 2 INJECTION INTRAMUSCULAR; INTRAVENOUS at 03:10

## 2024-10-15 RX ADMIN — MEDROXYPROGESTERONE ACETATE 10 MG: 5 TABLET ORAL at 06:10

## 2024-10-15 RX ADMIN — SODIUM CHLORIDE, SODIUM LACTATE, POTASSIUM CHLORIDE, AND CALCIUM CHLORIDE: 600; 310; 30; 20 INJECTION, SOLUTION INTRAVENOUS at 03:10

## 2024-10-15 RX ADMIN — LIDOCAINE HYDROCHLORIDE 100 MG: 20 INJECTION INTRAVENOUS at 03:10

## 2024-10-15 RX ADMIN — GLYCOPYRROLATE 0.2 MG: 0.2 INJECTION, SOLUTION INTRAMUSCULAR; INTRAVITREAL at 03:10

## 2024-10-15 RX ADMIN — SENNOSIDES AND DOCUSATE SODIUM 1 TABLET: 50; 8.6 TABLET ORAL at 08:10

## 2024-10-15 RX ADMIN — IBUPROFEN 800 MG: 800 TABLET, FILM COATED ORAL at 11:10

## 2024-10-15 RX ADMIN — PROPOFOL 80 MG: 10 INJECTION, EMULSION INTRAVENOUS at 03:10

## 2024-10-15 RX ADMIN — MIDAZOLAM HYDROCHLORIDE 2 MG: 1 INJECTION, SOLUTION INTRAMUSCULAR; INTRAVENOUS at 03:10

## 2024-10-15 RX ADMIN — FENTANYL CITRATE 100 MCG: 50 INJECTION, SOLUTION INTRAMUSCULAR; INTRAVENOUS at 03:10

## 2024-10-15 RX ADMIN — SODIUM CHLORIDE, POTASSIUM CHLORIDE, SODIUM LACTATE AND CALCIUM CHLORIDE: 600; 310; 30; 20 INJECTION, SOLUTION INTRAVENOUS at 12:10

## 2024-10-15 RX ADMIN — IBUPROFEN 800 MG: 800 TABLET, FILM COATED ORAL at 06:10

## 2024-10-15 NOTE — OP NOTE
OPERATIVE NOTE      PREOPERATIVE DIAGNOSIS:    Menometrorrhagia  Inclusion cyst of vagina    POSTOPERATIVE DIAGNOSIS    Menometrorrhagia  2.   Inclusion cyst of vagina    OPERATIVE PROCEDURE:    1.  Dilation and Curettage  2.  Hysteroscopy  3.  Removal of vaginal inclusion cyst    SURGEON:  Alexandra Bird MD    ASSISTANT:  None    ANESTHESIA:  General    ESTIMATED BLOOD LOSS:  100 ml    FINDINGS:  Thickened endometrium and endometrial blood clot.  Otherwise normal appearing endometrial cavity.    COMPLICATIONS:  None    SPECIMENS TO PATHOLOGY:  Endometrial curettings and inclusion cyst wall.    PROCEDURE IN DETAIL:    The procedure was explained to the patient and informed consent was obtained.  The patient was brought to the operating room where general anesthesia was administered.  An in and out catheterization was done, and she was positioned in the dorsal lithotomy position and  prepped and draped in the usual sterile manner.  A time out was performed.      A weighted speculum was placed in the vagina and the anterior lip of the cervix was grasped with a single tooth tenaculum.  The uterus was sounded to 10 cm.  The cervix was already dilated to a number 7 Hegar dilator.  A 5 mm diagnostic hysteroscope was then gently advanced to the uterine fundus with normal saline used as the distending medium.  The entire endometrial cavity was well visualized with thickened endometrium and blood clot noted. No focal lesions noted. The hysteroscope was then removed.  A sharp curettage was then performed with a large amount of tissue and blood clot removed.  The tissue removed was sent to pathology.      Following the sharp curettage, the endometrial cavity was visualized once again with no abnormalities noted.      At this time all instruments were removed, and the patient was awakened from anesthesia and brought to the recovery room in stable condition.  The patient tolerated the procedure well.  All counts were correct  x 3.

## 2024-10-15 NOTE — ASSESSMENT & PLAN NOTE
Symptomatic anemia at time of admission with stable vital signs  1 unit of pRBC ordered on admission  Repeat CBC is stable.  Continue to monitor vitals for hemodynamic stability

## 2024-10-15 NOTE — TRANSFER OF CARE
"Anesthesia Transfer of Care Note    Patient: Crissy Argueta    Procedure(s) Performed: Procedure(s) (LRB):  HYSTEROSCOPY, WITH DILATION AND CURETTAGE OF UTERUS (N/A)  EXCISION, CYST (N/A)    Patient location: PACU    Anesthesia Type: general    Transport from OR: Transported from OR on room air with adequate spontaneous ventilation    Post pain: adequate analgesia    Post assessment: no apparent anesthetic complications and tolerated procedure well    Post vital signs: stable    Level of consciousness: responds to stimulation    Nausea/Vomiting: no nausea/vomiting    Complications: none    Transfer of care protocol was followed      Last vitals: Visit Vitals  /60 (BP Location: Left arm, Patient Position: Lying)   Pulse (!) 59   Temp 36.9 °C (98.4 °F) (Oral)   Resp 18   Ht 5' 7" (1.702 m)   Wt 55.5 kg (122 lb 5.7 oz)   LMP  (LMP Unknown)   SpO2 99%   Breastfeeding No   BMI 19.16 kg/m²     "

## 2024-10-15 NOTE — HPI
43 yo female with history of sciatica here with progressively worsening vaginal bleeding for the past 3 weeks. She states that she has regular monthly menses and she thought that this was what it was. But the bleeding continued and started to get heavier as the days went on. Last week she was prescribed TXA by her PCP but she had nausea with it and stopped taking it. She was seen last night at an ER in [place], left AMA, and then came to [facility] for second opinion. In the 24 hours leading up to her arrival in our ED she reports going through 46 adult diapers (pack of 50 with only 4 left), passing out about 3 times in the last 24 hours, and constant blood clots. She reports some back pain, pelvic cramping, shortness of breath, and dizziness. She specfically denies fevers, chills, headaches, nausea, vomiting,  chest pain, bowel or bladder changes, and leg swelling.

## 2024-10-15 NOTE — ASSESSMENT & PLAN NOTE
Symptomatic anemia at time of admission with stable vital signs  1 unit of pRBC ordered on admission  Will repeat CBC with morning labs  Continue to monitor vitals for hemodynamic stability

## 2024-10-15 NOTE — ASSESSMENT & PLAN NOTE
Discussed treatment options with patient - continuation of provera vs D&C/hysteroscopy.  Patient reports she feels nauseated when taking provera and really would prefer a D&C at this time.  Risks vs benefits of both options discussed and she chooses D&C/hysteroscopy.  Consent form for procedure reviewed with patient and signed by her.  All questions answered.  Will keep patient NPO and proceed with D&C/hysteroscopy this afternoon.

## 2024-10-15 NOTE — SUBJECTIVE & OBJECTIVE
Interval History: Patient reports continued heavy bleeding, requiring pad changes every 1-2 hours.  When she stands up, she has large gushes of blood with clots.  She reports continued dizziness and weakness with ambulation.      Scheduled Meds:   medroxyPROGESTERone  10 mg Oral Q8H    senna-docusate 8.6-50 mg  1 tablet Oral BID     Continuous Infusions:   lactated ringers   Intravenous Continuous         PRN Meds:  Current Facility-Administered Medications:     0.9%  NaCl infusion (for blood administration), , Intravenous, Q24H PRN    acetaminophen, 650 mg, Oral, Q8H PRN    famotidine, 20 mg, Oral, On Call Procedure    melatonin, 6 mg, Oral, Nightly PRN    prochlorperazine, 10 mg, Oral, Q6H PRN    sodium chloride 0.9%, 10 mL, Intravenous, PRN    Review of patient's allergies indicates:   Allergen Reactions    Morphine Swelling and Hives    Tramadol Other (See Comments)     headache       Objective:     Vital Signs (Most Recent):  Temp: 97.7 °F (36.5 °C) (10/15/24 0707)  Pulse: (!) 54 (10/15/24 0707)  Resp: 18 (10/15/24 0707)  BP: 117/64 (10/15/24 0707)  SpO2: 98 % (10/15/24 0707) Vital Signs (24h Range):  Temp:  [97.7 °F (36.5 °C)-98.2 °F (36.8 °C)] 97.7 °F (36.5 °C)  Pulse:  [54-78] 54  Resp:  [16-22] 18  SpO2:  [98 %-100 %] 98 %  BP: (117-157)/(58-85) 117/64     Weight: 55.5 kg (122 lb 5.7 oz)  Body mass index is 19.16 kg/m².  No LMP recorded (lmp unknown).    I&O (Last 24H):    Intake/Output Summary (Last 24 hours) at 10/15/2024 1051  Last data filed at 10/14/2024 2226  Gross per 24 hour   Intake 401.25 ml   Output --   Net 401.25 ml         Laboratory:  CBC:   Recent Labs   Lab 10/15/24  0535   WBC 6.13   RBC 3.82*   HGB 8.7*   HCT 29.0*      MCV 76*   MCH 22.8*   MCHC 30.0*       Diagnostic Results:  Pelvic u/s reviewed.     Physical Exam:   Constitutional: She is oriented to person, place, and time. She appears well-developed and well-nourished. No distress.    HENT:   Head: Normocephalic and  atraumatic.      Cardiovascular:  Normal rate and regular rhythm.             Pulmonary/Chest: Effort normal.        Abdominal: Soft. She exhibits no distension. There is no abdominal tenderness.             Musculoskeletal: No tenderness or edema.       Neurological: She is alert and oriented to person, place, and time.    Skin: Skin is warm and dry.    Psychiatric: She has a normal mood and affect.

## 2024-10-15 NOTE — PHARMACY MED REC
"Admission Medication History     The home medication history was taken by Neville Call.    You may go to "Admission" then "Reconcile Home Medications" tabs to review and/or act upon these items.     The home medication list has been updated by the Pharmacy department.   Please read ALL comments highlighted in yellow.   Please address this information as you see fit.    Feel free to contact us if you have any questions or require assistance.      The medications listed below were removed from the home medication list. Please reorder if appropriate:  XANAX 2MG  NORCO 5-325MG  ROBAXIN 500MG  ZOFRAN 4MG    Medications listed below were obtained from: Analytic software- [employer], Medical records, and Patient's pharmacy  (Not in a hospital admission)      Neville Call  [phone]    Current Outpatient Medications on File Prior to Encounter   Medication Sig Dispense Refill Last Dose/Taking    rOPINIRole (REQUIP) 0.25 MG tablet Take 0.5 mg by mouth every evening.   Taking               .          "

## 2024-10-15 NOTE — ANESTHESIA POSTPROCEDURE EVALUATION
Anesthesia Post Evaluation    Patient: Crissy Argueta    Procedure(s) Performed: Procedure(s) (LRB):  HYSTEROSCOPY, WITH DILATION AND CURETTAGE OF UTERUS (N/A)  EXCISION, CYST (N/A)    Final Anesthesia Type: general      Patient location during evaluation: PACU  Patient participation: Yes- Able to Participate  Level of consciousness: awake and alert  Post-procedure vital signs: reviewed and stable  Pain management: adequate  Airway patency: patent  TAWANDA mitigation strategies: Extubation while patient is awake  PONV status at discharge: No PONV  Anesthetic complications: no      Cardiovascular status: hemodynamically stable  Respiratory status: spontaneous ventilation  Hydration status: euvolemic  Follow-up not needed.              Vitals Value Taken Time   /66 10/15/24 1720   Temp 36.3 °C (97.3 °F) 10/15/24 1720   Pulse 52 10/15/24 1720   Resp 18 10/15/24 1720   SpO2 100 % 10/15/24 1720         Event Time   Out of Recovery 10/15/2024 17:05:41         Pain/Myra Score: Pain Rating Prior to Med Admin: 6 (10/15/2024  4:45 PM)  Myra Score: 10 (10/15/2024  5:00 PM)

## 2024-10-15 NOTE — PLAN OF CARE
[facility] - Med Surg 3  Discharge Assessment    Primary Care Provider: No, Primary Doctor     Discharge Assessment (most recent)       BRIEF DISCHARGE ASSESSMENT - 10/15/24 1232          Discharge Planning    Assessment Type Discharge Planning Brief Assessment     Resource/Environmental Concerns none     Support Systems Family members;Friends/neighbors     Current Living Arrangements home     Patient/Family Anticipates Transition to home     Patient/Family Anticipated Services at Transition none     DME Needed Upon Discharge  none     Discharge Plan A Home                     CM following, no identified needs at this time.

## 2024-10-15 NOTE — SUBJECTIVE & OBJECTIVE
OB History   No obstetric history on file.     Past Medical History:   Diagnosis Date    Anxiety disorder, unspecified     Back injury     Rheumatoid arthritis, unspecified     Sciatica      History reviewed. No pertinent surgical history.    (Not in a hospital admission)      Review of patient's allergies indicates:   Allergen Reactions    Morphine Swelling and Hives    Tramadol Other (See Comments)     headache        Family History    None       Tobacco Use    Smoking status: Never    Smokeless tobacco: Never   Substance and Sexual Activity    Alcohol use: Never    Drug use: Never    Sexual activity: Not on file     Review of Systems   Constitutional:  Negative for chills and fever.   Respiratory:  Negative for cough and shortness of breath.    Cardiovascular:  Negative for chest pain.   Gastrointestinal:  Negative for abdominal pain, nausea and vomiting.   Genitourinary:  Positive for pelvic pain and vaginal bleeding. Negative for dysuria, menorrhagia and vaginal odor.   Neurological:  Positive for syncope.      Objective:     Vital Signs (Most Recent):  Temp: 97.9 °F (36.6 °C) (10/14/24 1935)  Pulse: (!) 56 (10/14/24 1935)  Resp: 19 (10/14/24 1935)  BP: (!) 142/85 (10/14/24 1935)  SpO2: 100 % (10/14/24 1935) Vital Signs (24h Range):  Temp:  [97.5 °F (36.4 °C)-98.2 °F (36.8 °C)] 97.9 °F (36.6 °C)  Pulse:  [56-81] 56  Resp:  [16-20] 19  SpO2:  [100 %] 100 %  BP: (125-157)/(58-85) 142/85     Weight: 58 kg (127 lb 13.9 oz)  Body mass index is 20.03 kg/m².    No LMP recorded (lmp unknown).     Physical Exam:   Constitutional: She is oriented to person, place, and time. She appears well-developed and well-nourished. No distress.    HENT:   Head: Normocephalic and atraumatic.    Eyes: Conjunctivae and EOM are normal. Right eye exhibits no discharge. Left eye exhibits no discharge.     Cardiovascular:  Normal rate.             Pulmonary/Chest: Effort normal. No accessory muscle usage. No tachypnea. No respiratory  distress.        Abdominal: Soft. She exhibits no distension. There is no abdominal tenderness.     Genitourinary:          Pelvic exam was performed with patient supine.   The external female genitalia was normal.     Labial bartholins normal.There is no rash, tenderness, lesion or injury on the right labia. There is no rash, tenderness, lesion or injury on the left labia. Right adnexum displays no mass, no tenderness and no fullness. Left adnexum displays no mass, no tenderness and no fullness. There is bleeding (4cm clot evacuated from vagina, no active bleeding noted from cervix) in the vagina. No erythema, vaginal discharge or tenderness in the vagina.    No foreign body (visually dilated) in the vagina.      No signs of injury in the vagina.   Cervix exhibits no motion tenderness, no discharge, no friability and no tenderness. Uterus is enlarged. Uterus is not deviated and not tender.           Musculoskeletal: Normal range of motion and moves all extremeties. No tenderness or edema.       Neurological: She is alert and oriented to person, place, and time.    Skin: Skin is warm and dry.    Psychiatric: She has a normal mood and affect. Her behavior is normal. Thought content normal.        Laboratory:  Urine Pregnancy Test:   Recent Labs   Lab 10/14/24  1802   PREGTESTUR Negative     Recent Lab Results         10/14/24  1802   10/14/24  1451   10/13/24  2144        Unit Blood Type Code   6200  [P]         Unit Expiration   [id number]  [P]         Unit Blood Type   A POS  [P]         Albumin   4.4   4.1       ALP   63   54       ALT   7   8       Anion Gap   9   11       Appearance, UA Clear           AST   15   13       Baso #   0.04   0.03       Basophil %   1.0   0.6       Bilirubin (UA) Negative           BILIRUBIN TOTAL   0.7  Comment: For infants and newborns, interpretation of results should be based  on gestational age, weight and in agreement with clinical  observations.    Premature Infant  recommended reference ranges:  Up to 24 hours.............<8.0 mg/dL  Up to 48 hours............<12.0 mg/dL  3-5 days..................<15.0 mg/dL  6-29 days.................<15.0 mg/dL     0.6  Comment: For infants and newborns, interpretation of results should be based  on gestational age, weight and in agreement with clinical  observations.    Premature Infant recommended reference ranges:  Up to 24 hours.............<8.0 mg/dL  Up to 48 hours............<12.0 mg/dL  3-5 days..................<15.0 mg/dL  6-29 days.................<15.0 mg/dL         BUN   7   9       Calcium   9.8   9.2       Chloride   108   107       CO2   24   24       CODING SYSTEM   [id number]  [P]         Color, UA Colorless           Creatinine   0.9   0.9       Crossmatch Interpretation   Compatible  [P]         Differential Method   Automated   Automated       DISPENSE STATUS   ISSUED  [P]         eGFR   >60   >60.0       Eos #   0.1   0.1       Eos %   1.2   1.4       Glucose   91   108       Glucose, UA Negative           Gran # (ANC)   2.4   2.9       Gran %   58.5   59.3       Group & Rh   A POS         Beta HCG Quant     <1.2  Comment: Quantitative HCG Reference Ranges:  Males........................<5.0 mIU/mL  Non-Pregnant Females.........<5.0 mIU/mL  Pregnancy:  Weeks post-LMP...............Range (mIU/mL)  1-10  weeks.....................202-231,000  11-15 weeks..................22,536-234,990  16-22 weeks...................8,007-50,064  23-40 weeks...................1,600-49,413    NOTE:  This assay is not FDA approved for tumor screening,   diagnosis, or monitoring.         Hematocrit   28.4   27.7       Hemoglobin   8.2   8.3       Immature Grans (Abs)   0.01  Comment: Mild elevation in immature granulocytes is non specific and   can be seen in a variety of conditions including stress response,   acute inflammation, trauma and pregnancy. Correlation with other   laboratory and clinical findings is essential.     0.01  Comment:  Mild elevation in immature granulocytes is non specific and   can be seen in a variety of conditions including stress response,   acute inflammation, trauma and pregnancy. Correlation with other   laboratory and clinical findings is essential.         Immature Granulocytes   0.2   0.2       INDIRECT ALVARADO   NEG         INR   1.0  Comment: Coumadin Therapy:  2.0 - 3.0 for INR for all indicators except mechanical heart valves  and antiphospholipid syndromes which should use 2.5 - 3.5.           Ketones, UA Negative           Leukocyte Esterase, UA Negative           Lymph #   1.2   1.3       Lymph %   29.7   27.0       MCH   21.2   22.3       MCHC   28.9   30.0       MCV   74   75       Mono #   0.4   0.6       Mono %   9.4   11.5       MPV   10.5   10.5       NITRITE UA Negative           nRBC   0   0       Blood, UA Negative           pH, UA 6.0           Platelet Count   386   373       Potassium   3.9   3.8       hCG Qualitative, Urine Negative           Product Code   H4541O16  [P]         PROTEIN TOTAL   7.5   6.8       Protein, UA Negative  Comment: Recommend a 24 hour urine protein or a urine   protein/creatinine ratio if globulin induced proteinuria is  clinically suspected.             PT   11.3         RBC   3.86   3.72       RDW   17.2   17.3       Sodium   141   142       Spec Grav UA 1.010           Specimen Outdate   10/17/2024 23:59         Specimen UA Urine, Clean Catch           UNIT NUMBER   [id number]  [P]         UROBILINOGEN UA Negative           WBC   4.04   4.86                [P] - Preliminary Result               Diagnostic Results:  US: Reviewed

## 2024-10-15 NOTE — PLAN OF CARE
Discussed poc with pt, pt verbalized understanding    Purposeful rounding every 2hours    VS wnl   Fall precautions in place, remains injury free  Pt denies c/o pain and nausea    Bed locked at lowest position  Call light within reach    Chart check complete  Will cont with POC  Pad count for this shift is 6 (2 at one time)

## 2024-10-15 NOTE — H&P
[facility] - Emergency Dept.  Obstetrics & Gynecology  History & Physical    Patient Name: Crissy Argueta  MRN: [id number]  Admission Date: 10/14/2024  Primary Care Provider: Vera, Primary Doctor    Subjective:     Chief Complaint/Reason for Admission: menorrhagia    History of Present Illness:  43 yo female with history of sciatica here with progressively worsening vaginal bleeding for the past 3 weeks. She states that she has regular monthly menses and she thought that this was what it was. But the bleeding continued and started to get heavier as the days went on. Last week she was prescribed TXA by her PCP but she had nausea with it and stopped taking it. She was seen last night at an ER in [place], left [place], and then came to [facility] for second opinion. In the 24 hours leading up to her arrival in our ED she reports going through 46 adult diapers (pack of 50 with only 4 left), passing out about 3 times in the last 24 hours, and constant blood clots. She reports some back pain, pelvic cramping, shortness of breath, and dizziness. She specfically denies fevers, chills, headaches, nausea, vomiting,  chest pain, bowel or bladder changes, and leg swelling.           OB History   No obstetric history on file.     Past Medical History:   Diagnosis Date    Anxiety disorder, unspecified     Back injury     Rheumatoid arthritis, unspecified     Sciatica      History reviewed. No pertinent surgical history.    (Not in a hospital admission)      Review of patient's allergies indicates:   Allergen Reactions    Morphine Swelling and Hives    Tramadol Other (See Comments)     headache        Family History    None       Tobacco Use    Smoking status: Never    Smokeless tobacco: Never   Substance and Sexual Activity    Alcohol use: Never    Drug use: Never    Sexual activity: Not on file     Review of Systems   Constitutional:  Negative for chills and fever.   Respiratory:  Negative for cough and shortness of breath.     Cardiovascular:  Negative for chest pain.   Gastrointestinal:  Negative for abdominal pain, nausea and vomiting.   Genitourinary:  Positive for pelvic pain and vaginal bleeding. Negative for dysuria, menorrhagia and vaginal odor.   Neurological:  Positive for syncope.      Objective:     Vital Signs (Most Recent):  Temp: 97.9 °F (36.6 °C) (10/14/24 1935)  Pulse: (!) 56 (10/14/24 1935)  Resp: 19 (10/14/24 1935)  BP: (!) 142/85 (10/14/24 1935)  SpO2: 100 % (10/14/24 1935) Vital Signs (24h Range):  Temp:  [97.5 °F (36.4 °C)-98.2 °F (36.8 °C)] 97.9 °F (36.6 °C)  Pulse:  [56-81] 56  Resp:  [16-20] 19  SpO2:  [100 %] 100 %  BP: (125-157)/(58-85) 142/85     Weight: 58 kg (127 lb 13.9 oz)  Body mass index is 20.03 kg/m².    No LMP recorded (lmp unknown).     Physical Exam:   Constitutional: She is oriented to person, place, and time. She appears well-developed and well-nourished. No distress.    HENT:   Head: Normocephalic and atraumatic.    Eyes: Conjunctivae and EOM are normal. Right eye exhibits no discharge. Left eye exhibits no discharge.     Cardiovascular:  Normal rate.             Pulmonary/Chest: Effort normal. No accessory muscle usage. No tachypnea. No respiratory distress.        Abdominal: Soft. She exhibits no distension. There is no abdominal tenderness.     Genitourinary:          Pelvic exam was performed with patient supine.   The external female genitalia was normal.     Labial bartholins normal.There is no rash, tenderness, lesion or injury on the right labia. There is no rash, tenderness, lesion or injury on the left labia. Right adnexum displays no mass, no tenderness and no fullness. Left adnexum displays no mass, no tenderness and no fullness. There is bleeding (4cm clot evacuated from vagina, no active bleeding noted from cervix) in the vagina. No erythema, vaginal discharge or tenderness in the vagina.    No foreign body (visually dilated) in the vagina.      No signs of injury in the vagina.    Cervix exhibits no motion tenderness, no discharge, no friability and no tenderness. Uterus is enlarged. Uterus is not deviated and not tender.           Musculoskeletal: Normal range of motion and moves all extremeties. No tenderness or edema.       Neurological: She is alert and oriented to person, place, and time.    Skin: Skin is warm and dry.    Psychiatric: She has a normal mood and affect. Her behavior is normal. Thought content normal.        Laboratory:  Urine Pregnancy Test:   Recent Labs   Lab 10/14/24  1802   PREGTESTUR Negative     Recent Lab Results         10/14/24  1802   10/14/24  1451   10/13/24  2144        Unit Blood Type Code   6200  [P]         Unit Expiration   [id number]  [P]         Unit Blood Type   A POS  [P]         Albumin   4.4   4.1       ALP   63   54       ALT   7   8       Anion Gap   9   11       Appearance, UA Clear           AST   15   13       Baso #   0.04   0.03       Basophil %   1.0   0.6       Bilirubin (UA) Negative           BILIRUBIN TOTAL   0.7  Comment: For infants and newborns, interpretation of results should be based  on gestational age, weight and in agreement with clinical  observations.    Premature Infant recommended reference ranges:  Up to 24 hours.............<8.0 mg/dL  Up to 48 hours............<12.0 mg/dL  3-5 days..................<15.0 mg/dL  6-29 days.................<15.0 mg/dL     0.6  Comment: For infants and newborns, interpretation of results should be based  on gestational age, weight and in agreement with clinical  observations.    Premature Infant recommended reference ranges:  Up to 24 hours.............<8.0 mg/dL  Up to 48 hours............<12.0 mg/dL  3-5 days..................<15.0 mg/dL  6-29 days.................<15.0 mg/dL         BUN   7   9       Calcium   9.8   9.2       Chloride   108   107       CO2   24   24       CODING SYSTEM   [id number]  [P]         Color, UA Colorless           Creatinine   0.9   0.9       Crossmatch  Interpretation   Compatible  [P]         Differential Method   Automated   Automated       DISPENSE STATUS   ISSUED  [P]         eGFR   >60   >60.0       Eos #   0.1   0.1       Eos %   1.2   1.4       Glucose   91   108       Glucose, UA Negative           Gran # (ANC)   2.4   2.9       Gran %   58.5   59.3       Group & Rh   A POS         Beta HCG Quant     <1.2  Comment: Quantitative HCG Reference Ranges:  Males........................<5.0 mIU/mL  Non-Pregnant Females.........<5.0 mIU/mL  Pregnancy:  Weeks post-LMP...............Range (mIU/mL)  1-10  weeks.....................202-231,000  11-15 weeks..................22,536-234,990  16-22 weeks...................8,007-50,064  23-40 weeks...................1,600-49,413    NOTE:  This assay is not FDA approved for tumor screening,   diagnosis, or monitoring.         Hematocrit   28.4   27.7       Hemoglobin   8.2   8.3       Immature Grans (Abs)   0.01  Comment: Mild elevation in immature granulocytes is non specific and   can be seen in a variety of conditions including stress response,   acute inflammation, trauma and pregnancy. Correlation with other   laboratory and clinical findings is essential.     0.01  Comment: Mild elevation in immature granulocytes is non specific and   can be seen in a variety of conditions including stress response,   acute inflammation, trauma and pregnancy. Correlation with other   laboratory and clinical findings is essential.         Immature Granulocytes   0.2   0.2       INDIRECT ALVARADO   NEG         INR   1.0  Comment: Coumadin Therapy:  2.0 - 3.0 for INR for all indicators except mechanical heart valves  and antiphospholipid syndromes which should use 2.5 - 3.5.           Ketones, UA Negative           Leukocyte Esterase, UA Negative           Lymph #   1.2   1.3       Lymph %   29.7   27.0       MCH   21.2   22.3       MCHC   28.9   30.0       MCV   74   75       Mono #   0.4   0.6       Mono %   9.4   11.5       MPV   10.5    10.5       NITRITE UA Negative           nRBC   0   0       Blood, UA Negative           pH, UA 6.0           Platelet Count   386   373       Potassium   3.9   3.8       hCG Qualitative, Urine Negative           Product Code   [id number]  [P]         PROTEIN TOTAL   7.5   6.8       Protein, UA Negative  Comment: Recommend a 24 hour urine protein or a urine   protein/creatinine ratio if globulin induced proteinuria is  clinically suspected.             PT   11.3         RBC   3.86   3.72       RDW   17.2   17.3       Sodium   141   142       Spec Grav UA 1.010           Specimen Outdate   10/17/2024 23:59         Specimen UA Urine, Clean Catch           UNIT NUMBER   [id number]  [P]         UROBILINOGEN UA Negative           WBC   4.04   4.86                [P] - Preliminary Result               Diagnostic Results:  US: Reviewed    Transvaginal US, 10/14/2024    FINDINGS:  uterus retroflexed or retroverted length measurement 12 cm.  Endometrial stripe thickness measurements 16 mm.  Questionable hydrosalpinx bilateral.  Ovaries unremarkable with small cysts likely physiologic.  Technique is transabdominal and transvaginal     Impression:     Questionable hydrosalpinx small to moderate caliber    Assessment/Plan:     Renal/  Abnormal uterine bleeding  Provera 10mg TID  Pad counts  Vitals q4  Encourage PO hydration in setting of nation wide IV fluid shortage  CBC in AM    Oncology  Blood loss anemia  Symptomatic anemia at time of admission with stable vital signs  1 unit of pRBC ordered on admission  Will repeat CBC with morning labs  Continue to monitor vitals for hemodynamic stability        Sarah Garduno MD  Obstetrics & Gynecology  [facility] - Emergency Dept.

## 2024-10-15 NOTE — ANESTHESIA PREPROCEDURE EVALUATION
10/15/2024  Crissy Argueta is a 44 y.o., female.    Patient Active Problem List   Diagnosis    Abnormal uterine bleeding    Blood loss anemia     History reviewed. No pertinent surgical history.    Pre-op Assessment    I have reviewed the Patient Summary Reports.    I have reviewed the NPO Status.   I have reviewed the Medications.     Review of Systems  Anesthesia Hx:  No problems with previous Anesthesia                Social:  Non-Smoker       Hematology/Oncology:       -- Anemia:                                  Cardiovascular:  Cardiovascular Normal                                              Pulmonary:  Pulmonary Normal                       Renal/:  Renal/ Normal                 Hepatic/GI:  Hepatic/GI Normal                    Musculoskeletal:  Arthritis   Rheumatoid arthritis            OB/GYN/PEDS:  Abnormal uterine bleeding               Neurological:  Neurology Normal                                      Endocrine:  Endocrine Normal            Psych:   anxiety                 Physical Exam  General: Well nourished    Airway:  Mallampati: II   Mouth Opening: Normal  TM Distance: Normal  Neck ROM: Normal ROM    Dental:  Intact        Anesthesia Plan  Type of Anesthesia, risks & benefits discussed:    Anesthesia Type: Gen ETT, Gen Supraglottic Airway, MAC  Intra-op Monitoring Plan: Standard ASA Monitors  Post Op Pain Control Plan: multimodal analgesia  Induction:  IV  Airway Plan: , Post-Induction  Informed Consent: Informed consent signed with the Patient and all parties understand the risks and agree with anesthesia plan.  All questions answered.   ASA Score: 2    Ready For Surgery From Anesthesia Perspective.     .      Chemistry        Component Value Date/Time     10/14/2024 1451    K 3.9 10/14/2024 1451     10/14/2024 1451    CO2 24 10/14/2024 1451    BUN 7 10/14/2024 1451     CREATININE 0.9 10/14/2024 1451    GLU 91 10/14/2024 1451        Component Value Date/Time    CALCIUM 9.8 10/14/2024 1451    ALKPHOS 63 10/14/2024 1451    AST 15 10/14/2024 1451    ALT 7 (L) 10/14/2024 1451    BILITOT 0.7 10/14/2024 1451    ESTGFRAFRICA 88 06/08/2022 1115    ESTGFRAFRICA >60 02/14/2018 1211    EGFRNONAA >60 02/14/2018 1211        Lab Results   Component Value Date    WBC 6.13 10/15/2024    HGB 8.7 (L) 10/15/2024    HCT 29.0 (L) 10/15/2024    MCV 76 (L) 10/15/2024     10/15/2024

## 2024-10-15 NOTE — PROGRESS NOTES
[facility]  Obstetrics & Gynecology  Progress Note    Patient Name: Crissy Argueta  MRN: [id number]  Admission Date: 10/14/2024  Primary Care Provider: Vera, Primary Doctor  Principal Problem: Abnormal uterine bleeding    Subjective:     HPI:  45 yo female with history of sciatica here with progressively worsening vaginal bleeding for the past 3 weeks. She states that she has regular monthly menses and she thought that this was what it was. But the bleeding continued and started to get heavier as the days went on. Last week she was prescribed TXA by her PCP but she had nausea with it and stopped taking it. She was seen last night at an ER in [place], left A, and then came to [facility] for second opinion. In the 24 hours leading up to her arrival in our ED she reports going through 46 adult diapers (pack of 50 with only 4 left), passing out about 3 times in the last 24 hours, and constant blood clots. She reports some back pain, pelvic cramping, shortness of breath, and dizziness. She specfically denies fevers, chills, headaches, nausea, vomiting,  chest pain, bowel or bladder changes, and leg swelling.       Interval History: Patient reports continued heavy bleeding, requiring pad changes every 1-2 hours.  When she stands up, she has large gushes of blood with clots.  She reports continued dizziness and weakness with ambulation.      Scheduled Meds:   medroxyPROGESTERone  10 mg Oral Q8H    senna-docusate 8.6-50 mg  1 tablet Oral BID     Continuous Infusions:   lactated ringers   Intravenous Continuous         PRN Meds:  Current Facility-Administered Medications:     0.9%  NaCl infusion (for blood administration), , Intravenous, Q24H PRN    acetaminophen, 650 mg, Oral, Q8H PRN    famotidine, 20 mg, Oral, On Call Procedure    melatonin, 6 mg, Oral, Nightly PRN    prochlorperazine, 10 mg, Oral, Q6H PRN    sodium chloride 0.9%, 10 mL, Intravenous, PRN    Review of patient's allergies indicates:   Allergen  Reactions    Morphine Swelling and Hives    Tramadol Other (See Comments)     headache       Objective:     Vital Signs (Most Recent):  Temp: 97.7 °F (36.5 °C) (10/15/24 0707)  Pulse: (!) 54 (10/15/24 0707)  Resp: 18 (10/15/24 0707)  BP: 117/64 (10/15/24 0707)  SpO2: 98 % (10/15/24 0707) Vital Signs (24h Range):  Temp:  [97.7 °F (36.5 °C)-98.2 °F (36.8 °C)] 97.7 °F (36.5 °C)  Pulse:  [54-78] 54  Resp:  [16-22] 18  SpO2:  [98 %-100 %] 98 %  BP: (117-157)/(58-85) 117/64     Weight: 55.5 kg (122 lb 5.7 oz)  Body mass index is 19.16 kg/m².  No LMP recorded (lmp unknown).    I&O (Last 24H):    Intake/Output Summary (Last 24 hours) at 10/15/2024 1051  Last data filed at 10/14/2024 2226  Gross per 24 hour   Intake 401.25 ml   Output --   Net 401.25 ml         Laboratory:  CBC:   Recent Labs   Lab 10/15/24  0535   WBC 6.13   RBC 3.82*   HGB 8.7*   HCT 29.0*      MCV 76*   MCH 22.8*   MCHC 30.0*       Diagnostic Results:  Pelvic u/s reviewed.     Physical Exam:   Constitutional: She is oriented to person, place, and time. She appears well-developed and well-nourished. No distress.    HENT:   Head: Normocephalic and atraumatic.      Cardiovascular:  Normal rate and regular rhythm.             Pulmonary/Chest: Effort normal.        Abdominal: Soft. She exhibits no distension. There is no abdominal tenderness.             Musculoskeletal: No tenderness or edema.       Neurological: She is alert and oriented to person, place, and time.    Skin: Skin is warm and dry.    Psychiatric: She has a normal mood and affect.            Assessment/Plan:     * Abnormal uterine bleeding  Discussed treatment options with patient - continuation of provera vs D&C/hysteroscopy.  Patient reports she feels nauseated when taking provera and really would prefer a D&C at this time.  Risks vs benefits of both options discussed and she chooses D&C/hysteroscopy.  Consent form for procedure reviewed with patient and signed by her.  All questions  answered.  Will keep patient NPO and proceed with D&C/hysteroscopy this afternoon.    Blood loss anemia  Symptomatic anemia at time of admission with stable vital signs  1 unit of pRBC ordered on admission  Repeat CBC is stable.  Continue to monitor vitals for hemodynamic stability        Alexandra Bird MD  Obstetrics & Gynecology  [facility]

## 2024-10-15 NOTE — HOSPITAL COURSE
10/14/24- Admission, started on provera 10mg TID, 1 unit pRBCs  10/15: Underwent D&C, tolerated procedure well  10/16: POD 1, vitals stable, H/H stable, ready for discharge

## 2024-10-15 NOTE — ASSESSMENT & PLAN NOTE
Provera 10mg TID  Pad counts  Vitals q4  Encourage PO hydration in setting of nation wide IV fluid shortage  CBC in AM

## 2024-10-16 VITALS
HEIGHT: 67 IN | RESPIRATION RATE: 17 BRPM | WEIGHT: 125.69 LBS | TEMPERATURE: 98 F | DIASTOLIC BLOOD PRESSURE: 57 MMHG | OXYGEN SATURATION: 100 % | BODY MASS INDEX: 19.73 KG/M2 | SYSTOLIC BLOOD PRESSURE: 112 MMHG | HEART RATE: 53 BPM

## 2024-10-16 PROBLEM — N93.9 ABNORMAL UTERINE BLEEDING: Status: RESOLVED | Noted: 2024-10-14 | Resolved: 2024-10-16

## 2024-10-16 LAB
BASOPHILS # BLD AUTO: 0.04 K/UL (ref 0–0.2)
BASOPHILS NFR BLD: 0.7 % (ref 0–1.9)
DIFFERENTIAL METHOD BLD: ABNORMAL
EOSINOPHIL # BLD AUTO: 0.1 K/UL (ref 0–0.5)
EOSINOPHIL NFR BLD: 2.5 % (ref 0–8)
ERYTHROCYTE [DISTWIDTH] IN BLOOD BY AUTOMATED COUNT: 16.9 % (ref 11.5–14.5)
HCT VFR BLD AUTO: 27.8 % (ref 37–48.5)
HGB BLD-MCNC: 8.3 G/DL (ref 12–16)
IMM GRANULOCYTES # BLD AUTO: 0.02 K/UL (ref 0–0.04)
IMM GRANULOCYTES NFR BLD AUTO: 0.4 % (ref 0–0.5)
LYMPHOCYTES # BLD AUTO: 1.8 K/UL (ref 1–4.8)
LYMPHOCYTES NFR BLD: 31.2 % (ref 18–48)
MCH RBC QN AUTO: 22.9 PG (ref 27–31)
MCHC RBC AUTO-ENTMCNC: 29.9 G/DL (ref 32–36)
MCV RBC AUTO: 77 FL (ref 82–98)
MONOCYTES # BLD AUTO: 0.6 K/UL (ref 0.3–1)
MONOCYTES NFR BLD: 11.2 % (ref 4–15)
NEUTROPHILS # BLD AUTO: 3.1 K/UL (ref 1.8–7.7)
NEUTROPHILS NFR BLD: 54 % (ref 38–73)
NRBC BLD-RTO: 0 /100 WBC
PLATELET # BLD AUTO: 311 K/UL (ref 150–450)
PMV BLD AUTO: 11.3 FL (ref 9.2–12.9)
RBC # BLD AUTO: 3.63 M/UL (ref 4–5.4)
WBC # BLD AUTO: 5.64 K/UL (ref 3.9–12.7)

## 2024-10-16 PROCEDURE — 85025 COMPLETE CBC W/AUTO DIFF WBC: CPT | Performed by: OBSTETRICS & GYNECOLOGY

## 2024-10-16 PROCEDURE — 25000003 PHARM REV CODE 250: Performed by: OBSTETRICS & GYNECOLOGY

## 2024-10-16 PROCEDURE — 36415 COLL VENOUS BLD VENIPUNCTURE: CPT | Performed by: OBSTETRICS & GYNECOLOGY

## 2024-10-16 PROCEDURE — G0378 HOSPITAL OBSERVATION PER HR: HCPCS

## 2024-10-16 RX ORDER — MEDROXYPROGESTERONE ACETATE 10 MG/1
10 TABLET ORAL DAILY
Qty: 10 TABLET | Refills: 0 | Status: SHIPPED | OUTPATIENT
Start: 2024-10-16 | End: 2024-10-26

## 2024-10-16 RX ORDER — IBUPROFEN 800 MG/1
800 TABLET ORAL EVERY 6 HOURS PRN
Qty: 15 TABLET | Refills: 0 | Status: SHIPPED | OUTPATIENT
Start: 2024-10-16

## 2024-10-16 RX ADMIN — ACETAMINOPHEN 650 MG: 325 TABLET ORAL at 09:10

## 2024-10-16 RX ADMIN — IBUPROFEN 800 MG: 800 TABLET, FILM COATED ORAL at 06:10

## 2024-10-16 NOTE — ASSESSMENT & PLAN NOTE
Underwent D&C on 10/15/24.  - Bleeding has since stopped  - Will send home on Provera x 10 days  - Doing well, ready for discharge

## 2024-10-16 NOTE — PROGRESS NOTES
[facility]  Obstetrics & Gynecology  Progress Note    Patient Name: Crissy Argueta  MRN: [id number]  Admission Date: 10/14/2024  Primary Care Provider: Vera, Primary Doctor  Principal Problem: Abnormal uterine bleeding    Subjective:     HPI:  43 yo female with history of sciatica here with progressively worsening vaginal bleeding for the past 3 weeks. She states that she has regular monthly menses and she thought that this was what it was. But the bleeding continued and started to get heavier as the days went on. Last week she was prescribed TXA by her PCP but she had nausea with it and stopped taking it. She was seen last night at an ER in [place], left AMA, and then came to [facility] for second opinion. In the 24 hours leading up to her arrival in our ED she reports going through 46 adult diapers (pack of 50 with only 4 left), passing out about 3 times in the last 24 hours, and constant blood clots. She reports some back pain, pelvic cramping, shortness of breath, and dizziness. She specfically denies fevers, chills, headaches, nausea, vomiting,  chest pain, bowel or bladder changes, and leg swelling.       Interval History: POD 1 s/p D&C hysteroscopy. Ambulating, tolerating PO, doing well. Bleeding has stopped per patient. H/H stable. Ready for discharge.     Scheduled Meds:   ibuprofen  800 mg Oral Q6H    senna-docusate 8.6-50 mg  1 tablet Oral BID     Continuous Infusions:  PRN Meds:  Current Facility-Administered Medications:     acetaminophen, 650 mg, Oral, Q8H PRN    melatonin, 6 mg, Oral, Nightly PRN    prochlorperazine, 10 mg, Oral, Q6H PRN    sodium chloride 0.9%, 10 mL, Intravenous, PRN    Review of patient's allergies indicates:   Allergen Reactions    Morphine Swelling and Hives    Tramadol Other (See Comments)     headache       Objective:     Vital Signs (Most Recent):  Temp: 98.3 °F (36.8 °C) (10/16/24 0742)  Pulse: (!) 53 (10/16/24 0742)  Resp: 17 (10/16/24 0742)  BP: (!) 112/57 (10/16/24  0742)  SpO2: 100 % (10/16/24 0742) Vital Signs (24h Range):  Temp:  [97.3 °F (36.3 °C)-98.4 °F (36.9 °C)] 98.3 °F (36.8 °C)  Pulse:  [49-72] 53  Resp:  [11-18] 17  SpO2:  [97 %-100 %] 100 %  BP: (112-134)/(55-75) 112/57     Weight: 57 kg (125 lb 10.6 oz)  Body mass index is 19.68 kg/m².  No LMP recorded (lmp unknown).    I&O (Last 24H):    Intake/Output Summary (Last 24 hours) at 10/16/2024 0750  Last data filed at 10/15/2024 1620  Gross per 24 hour   Intake 400 ml   Output --   Net 400 ml         Laboratory:  Recent Lab Results         10/16/24  0535        Baso # 0.04       Basophil % 0.7       Differential Method Automated       Eos # 0.1       Eos % 2.5       Gran # (ANC) 3.1       Gran % 54.0       Hematocrit 27.8       Hemoglobin 8.3       Immature Grans (Abs) 0.02  Comment: Mild elevation in immature granulocytes is non specific and   can be seen in a variety of conditions including stress response,   acute inflammation, trauma and pregnancy. Correlation with other   laboratory and clinical findings is essential.         Immature Granulocytes 0.4       Lymph # 1.8       Lymph % 31.2       MCH 22.9       MCHC 29.9       MCV 77       Mono # 0.6       Mono % 11.2       MPV 11.3       nRBC 0       Platelet Count 311       RBC 3.63       RDW 16.9       WBC 5.64             I have personallly reviewed all pertinent lab results from the last 24 hours.    Physical Exam:   Constitutional: She is oriented to person, place, and time. She appears well-developed and well-nourished. No distress.    HENT:   Head: Normocephalic and atraumatic.    Eyes: Right eye exhibits no discharge. Left eye exhibits no discharge. No scleral icterus.      Pulmonary/Chest: Effort normal. No respiratory distress.        Abdominal: Soft. She exhibits no distension and no abdominal incision.             Musculoskeletal: Normal range of motion.       Neurological: She is alert and oriented to person, place, and time.    Skin: Skin is warm and  dry. She is not diaphoretic.    Psychiatric: She has a normal mood and affect. Her behavior is normal. Judgment and thought content normal.          Assessment/Plan:     * Abnormal uterine bleeding  Underwent D&C on 10/15/24.  - Bleeding has since stopped  - Will send home on Provera x 10 days  - Doing well, ready for discharge    Blood loss anemia  - H/H remains stable  - Patient asymptomatic and ready for discharge        Marisol Poe PA-C  Obstetrics & Gynecology  [facility]

## 2024-10-16 NOTE — PLAN OF CARE
Discussed poc with pt, pt verbalized understanding     Purposeful rounding every 2hours     VS wnl   Fall precautions in place, remains injury free  Pt denies c/o pain and nausea     Bed locked at lowest position  Call light within reach     Chart check complete  Will cont with POC  Pt is ready for discharge

## 2024-10-16 NOTE — PLAN OF CARE
[facility]  Discharge Final Note    Primary Care Provider: No, Primary Doctor    Expected Discharge Date: 10/16/2024    Final Discharge Note (most recent)       Final Note - 10/16/24 0928          Final Note    Assessment Type Final Discharge Note     Anticipated Discharge Disposition Home or Self Care                     Important Message from Medicare             Contact Info       Sarah Garduno MD   Specialty: Obstetrics and Gynecology    [address]  [facility] [address]   Phone: [phone]       Next Steps: Follow up in 2 week(s)          Dc dispo: home  Post Op f/u: Oct 30

## 2024-10-16 NOTE — DISCHARGE SUMMARY
[facility]  Obstetrics & Gynecology  Discharge Summary    Patient Name: Crissy Argueta  MRN: [id number]  Admission Date: 10/14/2024  Hospital Length of Stay: 0 days  Discharge Date and Time:  10/16/2024 7:58 AM  Attending Physician: Sarah Garduno MD   Discharging Provider: Marisol Poe PA-C  Primary Care Provider: Vera, Primary Doctor    HPI:  45 yo female with history of sciatica here with progressively worsening vaginal bleeding for the past 3 weeks. She states that she has regular monthly menses and she thought that this was what it was. But the bleeding continued and started to get heavier as the days went on. Last week she was prescribed TXA by her PCP but she had nausea with it and stopped taking it. She was seen last night at an ER in [place], left AMA, and then came to [facility] for second opinion. In the 24 hours leading up to her arrival in our ED she reports going through 46 adult diapers (pack of 50 with only 4 left), passing out about 3 times in the last 24 hours, and constant blood clots. She reports some back pain, pelvic cramping, shortness of breath, and dizziness. She specfically denies fevers, chills, headaches, nausea, vomiting,  chest pain, bowel or bladder changes, and leg swelling.       Hospital Course:  10/14/24- Admission, started on provera 10mg TID, 1 unit pRBCs  10/15: Underwent D&C, tolerated procedure well  10/16: POD 1, vitals stable, H/H stable, ready for discharge    Goals of Care Treatment Preferences:  Code Status: Full Code      Procedure(s) (LRB):  HYSTEROSCOPY, WITH DILATION AND CURETTAGE OF UTERUS (N/A)  EXCISION, CYST (N/A)         Significant Diagnostic Studies: Labs: CBC   Recent Labs   Lab 10/14/24  1451 10/15/24  0535 10/16/24  0535   WBC 4.04 6.13 5.64   HGB 8.2* 8.7* 8.3*   HCT 28.4* 29.0* 27.8*    329 311    and All labs within the past 24 hours have been reviewed      Pending Diagnostic Studies:       Procedure Component Value Units Date/Time     Specimen to Pathology, Surgery Gynecology and Obstetrics [[id number]] Collected: 10/15/24 1621    Order Status: Sent Lab Status: In process Updated: 10/15/24 1657    Specimen: Tissue           Final Active Diagnoses:    Diagnosis Date Noted POA    Blood loss anemia [D50.0] 10/14/2024 Yes      Problems Resolved During this Admission:    Diagnosis Date Noted Date Resolved POA    PRINCIPAL PROBLEM:  Abnormal uterine bleeding [N93.9] 10/14/2024 10/16/2024 Yes        Discharged Condition: good    Disposition: Home or Self Care    Follow Up:   Follow-up Information       Sarah Garduno MD Follow up in 2 week(s).    Specialty: Obstetrics and Gynecology  Contact information:  [address] [facility] [address] [phone]                           Patient Instructions:      Diet Adult Regular     No dressing needed     Notify your health care provider if you experience any of the following:  temperature >100.4     Notify your health care provider if you experience any of the following:  persistent nausea and vomiting or diarrhea     Notify your health care provider if you experience any of the following:  severe uncontrolled pain     Notify your health care provider if you experience any of the following:  redness, tenderness, or signs of infection (pain, swelling, redness, odor or green/yellow discharge around incision site)     Notify your health care provider if you experience any of the following:  difficulty breathing or increased cough     Notify your health care provider if you experience any of the following:  severe persistent headache     Notify your health care provider if you experience any of the following:  worsening rash     Notify your health care provider if you experience any of the following:  persistent dizziness, light-headedness, or visual disturbances     Notify your health care provider if you experience any of the following:  increased confusion or weakness     Activity as tolerated      Medications:  Reconciled Home Medications:      Medication List        START taking these medications      ibuprofen 800 MG tablet  Commonly known as: ADVIL,MOTRIN  Take 1 tablet (800 mg total) by mouth every 6 (six) hours as needed for Pain.     medroxyPROGESTERone 10 MG tablet  Commonly known as: PROVERA  Take 1 tablet (10 mg total) by mouth once daily. for 10 days            CONTINUE taking these medications      rOPINIRole 0.25 MG tablet  Commonly known as: REQUIP  Take 0.5 mg by mouth every evening.              Marisol Poe PA-C  Obstetrics & Gynecology  [facility]

## 2024-10-16 NOTE — SUBJECTIVE & OBJECTIVE
Interval History: POD 1 s/p D&C hysteroscopy. Ambulating, tolerating PO, doing well. Bleeding has stopped per patient. H/H stable. Ready for discharge.     Scheduled Meds:   ibuprofen  800 mg Oral Q6H    senna-docusate 8.6-50 mg  1 tablet Oral BID     Continuous Infusions:  PRN Meds:  Current Facility-Administered Medications:     acetaminophen, 650 mg, Oral, Q8H PRN    melatonin, 6 mg, Oral, Nightly PRN    prochlorperazine, 10 mg, Oral, Q6H PRN    sodium chloride 0.9%, 10 mL, Intravenous, PRN    Review of patient's allergies indicates:   Allergen Reactions    Morphine Swelling and Hives    Tramadol Other (See Comments)     headache       Objective:     Vital Signs (Most Recent):  Temp: 98.3 °F (36.8 °C) (10/16/24 0742)  Pulse: (!) 53 (10/16/24 0742)  Resp: 17 (10/16/24 0742)  BP: (!) 112/57 (10/16/24 0742)  SpO2: 100 % (10/16/24 0742) Vital Signs (24h Range):  Temp:  [97.3 °F (36.3 °C)-98.4 °F (36.9 °C)] 98.3 °F (36.8 °C)  Pulse:  [49-72] 53  Resp:  [11-18] 17  SpO2:  [97 %-100 %] 100 %  BP: (112-134)/(55-75) 112/57     Weight: 57 kg (125 lb 10.6 oz)  Body mass index is 19.68 kg/m².  No LMP recorded (lmp unknown).    I&O (Last 24H):    Intake/Output Summary (Last 24 hours) at 10/16/2024 0750  Last data filed at 10/15/2024 1620  Gross per 24 hour   Intake 400 ml   Output --   Net 400 ml         Laboratory:  Recent Lab Results         10/16/24  0535        Baso # 0.04       Basophil % 0.7       Differential Method Automated       Eos # 0.1       Eos % 2.5       Gran # (ANC) 3.1       Gran % 54.0       Hematocrit 27.8       Hemoglobin 8.3       Immature Grans (Abs) 0.02  Comment: Mild elevation in immature granulocytes is non specific and   can be seen in a variety of conditions including stress response,   acute inflammation, trauma and pregnancy. Correlation with other   laboratory and clinical findings is essential.         Immature Granulocytes 0.4       Lymph # 1.8       Lymph % 31.2       MCH 22.9       MCHC  29.9       MCV 77       Mono # 0.6       Mono % 11.2       MPV 11.3       nRBC 0       Platelet Count 311       RBC 3.63       RDW 16.9       WBC 5.64             I have personallly reviewed all pertinent lab results from the last 24 hours.    Physical Exam:   Constitutional: She is oriented to person, place, and time. She appears well-developed and well-nourished. No distress.    HENT:   Head: Normocephalic and atraumatic.    Eyes: Right eye exhibits no discharge. Left eye exhibits no discharge. No scleral icterus.      Pulmonary/Chest: Effort normal. No respiratory distress.        Abdominal: Soft. She exhibits no distension and no abdominal incision.             Musculoskeletal: Normal range of motion.       Neurological: She is alert and oriented to person, place, and time.    Skin: Skin is warm and dry. She is not diaphoretic.    Psychiatric: She has a normal mood and affect. Her behavior is normal. Judgment and thought content normal.

## 2024-10-17 LAB
FINAL PATHOLOGIC DIAGNOSIS: NORMAL
GROSS: NORMAL
Lab: NORMAL

## 2024-10-30 ENCOUNTER — OFFICE VISIT (OUTPATIENT)
Dept: OBSTETRICS AND GYNECOLOGY | Facility: CLINIC | Age: 44
End: 2024-10-30
Payer: MEDICAID

## 2024-10-30 VITALS
SYSTOLIC BLOOD PRESSURE: 114 MMHG | WEIGHT: 125.69 LBS | HEIGHT: 67 IN | DIASTOLIC BLOOD PRESSURE: 82 MMHG | BODY MASS INDEX: 19.73 KG/M2

## 2024-10-30 DIAGNOSIS — N93.9 ABNORMAL UTERINE BLEEDING (AUB): Primary | ICD-10-CM

## 2024-10-30 PROCEDURE — 3008F BODY MASS INDEX DOCD: CPT | Mod: CPTII,,, | Performed by: STUDENT IN AN ORGANIZED HEALTH CARE EDUCATION/TRAINING PROGRAM

## 2024-10-30 PROCEDURE — 99214 OFFICE O/P EST MOD 30 MIN: CPT | Mod: S$PBB,,, | Performed by: STUDENT IN AN ORGANIZED HEALTH CARE EDUCATION/TRAINING PROGRAM

## 2024-10-30 PROCEDURE — 3079F DIAST BP 80-89 MM HG: CPT | Mod: CPTII,,, | Performed by: STUDENT IN AN ORGANIZED HEALTH CARE EDUCATION/TRAINING PROGRAM

## 2024-10-30 PROCEDURE — 1159F MED LIST DOCD IN RCRD: CPT | Mod: CPTII,,, | Performed by: STUDENT IN AN ORGANIZED HEALTH CARE EDUCATION/TRAINING PROGRAM

## 2024-10-30 PROCEDURE — 3074F SYST BP LT 130 MM HG: CPT | Mod: CPTII,,, | Performed by: STUDENT IN AN ORGANIZED HEALTH CARE EDUCATION/TRAINING PROGRAM

## 2024-10-30 PROCEDURE — 99212 OFFICE O/P EST SF 10 MIN: CPT | Mod: PBBFAC | Performed by: STUDENT IN AN ORGANIZED HEALTH CARE EDUCATION/TRAINING PROGRAM

## 2024-10-30 PROCEDURE — 99999 PR PBB SHADOW E&M-EST. PATIENT-LVL II: CPT | Mod: PBBFAC,,, | Performed by: STUDENT IN AN ORGANIZED HEALTH CARE EDUCATION/TRAINING PROGRAM

## 2024-10-30 RX ORDER — FERROUS SULFATE 325(65) MG
TABLET ORAL
COMMUNITY
Start: 2024-10-22

## 2024-10-30 RX ORDER — NORETHINDRONE 5 MG/1
10 TABLET ORAL DAILY
Qty: 60 TABLET | Refills: 11 | Status: SHIPPED | OUTPATIENT
Start: 2024-10-30

## 2024-10-30 NOTE — PROGRESS NOTES
Subjective     Patient ID: Crissy Argueta is a 44 y.o. female MRN: [id number]     Chief Complaint:  Follow-up       History of Present Illness    Crissy Argueta is a 44 y.o. female recently admitted to \[facility]\"" for menorrhagia. She was started on provera and received 1 unit of pRBC. She eventually underwent a D&C. Her bleeding stabilized and she was discharge home with provera 10mg for 10 days.     The patient has concerns about new back pain, cramping, heavier bleeding. She denies issues with abnormal discharge, vaginal bleeding, dysuria. She denies  dyspareunia and denies bleeding with intercourse.       HPI obtained from online patient questionnaire:  Follow-up  Pertinent negatives include no chest pain, chills, coughing, fever, headaches or vomiting.       GYN & OB History  OB History   OB History    Para Term  AB Living   3 3 3         SAB IAB Ectopic Multiple Live Births           3      # Outcome Date GA Lbr Jeronimo/2nd Weight Sex Type Anes PTL Lv   3 Term      Vag-Spont      2 Term      Vag-Spont      1 Term      Vag-Spont          No LMP recorded (lmp unknown).   Last Pap Date: No result found    Age of Menarche:     Review of Systems  Review of Systems   Constitutional:  Negative for chills and fever.   Respiratory:  Negative for cough and shortness of breath.    Cardiovascular:  Negative for chest pain.   Gastrointestinal:  Negative for diarrhea and vomiting.   Genitourinary:  Positive for menstrual problem and vaginal bleeding. Negative for dysmenorrhea, dyspareunia, dysuria, menorrhagia, pelvic pain, vaginal discharge, vaginal pain, urinary incontinence, postcoital bleeding, vaginal dryness and vaginal odor.   Integumentary:  Negative for breast mass, nipple discharge and breast skin changes.   Neurological:  Negative for headaches.   All other systems reviewed and are negative.  Breast: Negative for lump, mass, nipple discharge and skin changes        Objective   Physical Exam:    Constitutional: She is oriented to person, place, and time. She appears well-developed and well-nourished. No distress.    HENT:   Head: Normocephalic and atraumatic.    Eyes: EOM are normal.     Cardiovascular:  Normal rate.             Pulmonary/Chest: Effort normal.        Abdominal: Soft. She exhibits no distension. There is no abdominal tenderness.                 Neurological: She is alert and oriented to person, place, and time.    Skin: Skin is warm and dry. She is not diaphoretic.    Psychiatric: She has a normal mood and affect. Her behavior is normal. Thought content normal.          Surgical pathology, endometrial curettings:      1. Fragments of secretory endometrium with some fragments having prominent vessels suggestive of benign polyp formation.  No atypical hyperplasia or malignancy identified.  2. Specimen labeled vaginal cyst shows a fragment of benign squamous mucosa and a fragment of tissue with a benign cuboidal epithelial lined cyst.  No dysplasia identified      Transvaginal US 10/14/24     FINDINGS:  uterus retroflexed or retroverted length measurement 12 cm.  Endometrial stripe thickness measurements 16 mm.  Questionable hydrosalpinx bilateral.  Ovaries unremarkable with small cysts likely physiologic.  Technique is transabdominal and transvaginal     Impression:     Questionable hydrosalpinx small to moderate caliber  Assessment and Plan   Crissy Argueta is an 44 y.o. year old female here for Follow-up  .  Abnormal uterine bleeding (AUB)  -     norethindrone (AYGESTIN) 5 mg Tab; Take 2 tablets (10 mg total) by mouth once daily.  Dispense: 60 tablet; Refill: 11         Sarah Garduno MD  Obstetrics and Gynecology  [facility] [place], [place]

## 2024-11-11 PROBLEM — N93.9 ABNORMAL UTERINE BLEEDING (AUB): Status: ACTIVE | Noted: 2024-11-11

## 2024-12-09 ENCOUNTER — PATIENT MESSAGE (OUTPATIENT)
Dept: SURGERY | Facility: HOSPITAL | Age: 44
End: 2024-12-09
Payer: MEDICAID

## 2024-12-23 ENCOUNTER — PATIENT MESSAGE (OUTPATIENT)
Dept: SURGERY | Facility: HOSPITAL | Age: 44
End: 2024-12-23
Payer: MEDICAID

## 2024-12-27 ENCOUNTER — HOSPITAL ENCOUNTER (EMERGENCY)
Facility: HOSPITAL | Age: 44
Discharge: HOME | End: 2024-12-27
Attending: EMERGENCY MEDICINE
Payer: MEDICAID

## 2024-12-27 ENCOUNTER — TELEPHONE (OUTPATIENT)
Dept: OBSTETRICS AND GYNECOLOGY | Facility: CLINIC | Age: 44
End: 2024-12-27
Payer: MEDICAID

## 2024-12-27 VITALS
HEART RATE: 67 BPM | TEMPERATURE: 98 F | RESPIRATION RATE: 16 BRPM | SYSTOLIC BLOOD PRESSURE: 169 MMHG | WEIGHT: 124.38 LBS | DIASTOLIC BLOOD PRESSURE: 68 MMHG | BODY MASS INDEX: 19.48 KG/M2 | OXYGEN SATURATION: 99 %

## 2024-12-27 DIAGNOSIS — D50.0 ANEMIA DUE TO BLOOD LOSS: ICD-10-CM

## 2024-12-27 DIAGNOSIS — N93.9 ABNORMAL VAGINAL BLEEDING: Primary | ICD-10-CM

## 2024-12-27 LAB
ABO + RH BLD: NORMAL
ALBUMIN SERPL BCP-MCNC: 4.4 G/DL (ref 3.5–5.2)
ALP SERPL-CCNC: 63 U/L (ref 40–150)
ALT SERPL W/O P-5'-P-CCNC: 12 U/L (ref 10–44)
ANION GAP SERPL CALC-SCNC: 10 MMOL/L (ref 8–16)
AST SERPL-CCNC: 15 U/L (ref 10–40)
B-HCG UR QL: NEGATIVE
BASOPHILS # BLD AUTO: 0.06 K/UL (ref 0–0.2)
BASOPHILS NFR BLD: 1 % (ref 0–1.9)
BILIRUB SERPL-MCNC: 0.6 MG/DL (ref 0.1–1)
BILIRUB UR QL STRIP: NEGATIVE
BLD GP AB SCN CELLS X3 SERPL QL: NORMAL
BUN SERPL-MCNC: 5 MG/DL (ref 6–20)
CALCIUM SERPL-MCNC: 9.6 MG/DL (ref 8.7–10.5)
CHLORIDE SERPL-SCNC: 105 MMOL/L (ref 95–110)
CLARITY UR: CLEAR
CO2 SERPL-SCNC: 24 MMOL/L (ref 23–29)
COLOR UR: COLORLESS
CREAT SERPL-MCNC: 0.8 MG/DL (ref 0.5–1.4)
DIFFERENTIAL METHOD BLD: ABNORMAL
EOSINOPHIL # BLD AUTO: 0.1 K/UL (ref 0–0.5)
EOSINOPHIL NFR BLD: 1 % (ref 0–8)
ERYTHROCYTE [DISTWIDTH] IN BLOOD BY AUTOMATED COUNT: 17.9 % (ref 11.5–14.5)
EST. GFR  (NO RACE VARIABLE): >60 ML/MIN/1.73 M^2
GLUCOSE SERPL-MCNC: 85 MG/DL (ref 70–110)
GLUCOSE UR QL STRIP: NEGATIVE
HCT VFR BLD AUTO: 35.4 % (ref 37–48.5)
HGB BLD-MCNC: 10.3 G/DL (ref 12–16)
HGB UR QL STRIP: ABNORMAL
IMM GRANULOCYTES # BLD AUTO: 0.02 K/UL (ref 0–0.04)
IMM GRANULOCYTES NFR BLD AUTO: 0.3 % (ref 0–0.5)
KETONES UR QL STRIP: NEGATIVE
LEUKOCYTE ESTERASE UR QL STRIP: NEGATIVE
LYMPHOCYTES # BLD AUTO: 1.2 K/UL (ref 1–4.8)
LYMPHOCYTES NFR BLD: 20.6 % (ref 18–48)
MCH RBC QN AUTO: 21.8 PG (ref 27–31)
MCHC RBC AUTO-ENTMCNC: 29.1 G/DL (ref 32–36)
MCV RBC AUTO: 75 FL (ref 82–98)
MICROSCOPIC COMMENT: ABNORMAL
MONOCYTES # BLD AUTO: 0.5 K/UL (ref 0.3–1)
MONOCYTES NFR BLD: 8.6 % (ref 4–15)
NEUTROPHILS # BLD AUTO: 3.9 K/UL (ref 1.8–7.7)
NEUTROPHILS NFR BLD: 68.5 % (ref 38–73)
NITRITE UR QL STRIP: NEGATIVE
NRBC BLD-RTO: 0 /100 WBC
PH UR STRIP: 6 [PH] (ref 5–8)
PLATELET # BLD AUTO: 406 K/UL (ref 150–450)
PMV BLD AUTO: 9.8 FL (ref 9.2–12.9)
POTASSIUM SERPL-SCNC: 3.8 MMOL/L (ref 3.5–5.1)
PROT SERPL-MCNC: 7.8 G/DL (ref 6–8.4)
PROT UR QL STRIP: NEGATIVE
RBC # BLD AUTO: 4.72 M/UL (ref 4–5.4)
RBC #/AREA URNS HPF: 32 /HPF (ref 0–4)
SODIUM SERPL-SCNC: 139 MMOL/L (ref 136–145)
SP GR UR STRIP: <1.005 (ref 1–1.03)
SPECIMEN OUTDATE: NORMAL
URN SPEC COLLECT METH UR: ABNORMAL
UROBILINOGEN UR STRIP-ACNC: NEGATIVE EU/DL
WBC # BLD AUTO: 5.72 K/UL (ref 3.9–12.7)
WBC #/AREA URNS HPF: 2 /HPF (ref 0–5)

## 2024-12-27 PROCEDURE — 25000003 PHARM REV CODE 250: Performed by: EMERGENCY MEDICINE

## 2024-12-27 PROCEDURE — 99285 EMERGENCY DEPT VISIT HI MDM: CPT | Mod: 25

## 2024-12-27 PROCEDURE — 86900 BLOOD TYPING SEROLOGIC ABO: CPT | Performed by: NURSE PRACTITIONER

## 2024-12-27 PROCEDURE — 81025 URINE PREGNANCY TEST: CPT | Performed by: NURSE PRACTITIONER

## 2024-12-27 PROCEDURE — 85025 COMPLETE CBC W/AUTO DIFF WBC: CPT | Performed by: NURSE PRACTITIONER

## 2024-12-27 PROCEDURE — 80053 COMPREHEN METABOLIC PANEL: CPT | Performed by: NURSE PRACTITIONER

## 2024-12-27 PROCEDURE — 96374 THER/PROPH/DIAG INJ IV PUSH: CPT

## 2024-12-27 PROCEDURE — 81000 URINALYSIS NONAUTO W/SCOPE: CPT | Performed by: NURSE PRACTITIONER

## 2024-12-27 RX ORDER — TRANEXAMIC ACID 650 MG/1
TABLET ORAL
COMMUNITY
Start: 2024-10-09

## 2024-12-27 RX ORDER — ARIPIPRAZOLE 5 MG/1
1 TABLET ORAL DAILY
COMMUNITY
Start: 2024-12-05

## 2024-12-27 RX ADMIN — TRANEXAMIC ACID 550 MG: 100 INJECTION, SOLUTION INTRAVENOUS at 10:12

## 2024-12-27 NOTE — FIRST PROVIDER EVALUATION
Medical screening examination initiated.  I have conducted a focused provider triage encounter, findings are as follows:    Brief history of present illness:  Patient presents to ER for heavy vaginal bleeding onset 3 days ago with associated dizziness, fatigue, headache.    Vitals:    12/27/24 1504   BP: 127/71   BP Location: Right arm   Pulse: 94   Resp: 16   Temp: 98.3 °F (36.8 °C)   TempSrc: Oral   SpO2: 100%   Weight: 56.4 kg (124 lb 6.4 oz)       Pertinent physical exam:  Vital signs stable    Brief workup plan:  Workup, further treatment as warranted.  Preliminary workup initiated; this workup will be continued and followed by the physician or advanced practice provider that is assigned to the patient when roomed.

## 2024-12-27 NOTE — TELEPHONE ENCOUNTER
Talked to patient, identified patient. I offered her an appointment on Monday with [facility] but she said the bleeding was too bad so she was going to [facility] main [facility].

## 2024-12-28 NOTE — ED PROVIDER NOTES
SCRIBE #1 NOTE: I, Bonifacio Moser, am scribing for, and in the presence of, Seng Arnold MD. I have scribed the entire note.       History     Chief Complaint   Patient presents with    Female  Problem     Pt c/o heavy vaginal bleeding x 3 days with dizziness, fatigue, headache, and nausea.     Review of patient's allergies indicates:   Allergen Reactions    Morphine Swelling and Hives    Tramadol Other (See Comments)     headache         History of Present Illness     HPI    12/27/2024, 9:29 PM  History obtained from the patient      History of Present Illness: Crissy Argueta is a 44 y.o. female patient who presents to the Emergency Department for evaluation of vaginal bleeding which onset 3 days ago. Pt reports an episode of similar heavy and frequent vaginal bleeding in October which required a D&C. Pt was then d/c on Aygestin which was supposed to stop the vaginal bleeding (pt reports compliance). Patient reports normal bleeding since then, however, in the last 3 days heavy bleeding has returned. Pt reports she uses 3-4 pads at a time and replaces them every hour or so. Pt reports associated sxs of near syncope, HA, and weakness. Pt was sent by Dr. Garduno (OBGYN). No further complaints or concerns at this time.       Arrival mode: Personal Transportation    PCP: Vera, Primary Doctor        Past Medical History:  Past Medical History:   Diagnosis Date    Anxiety disorder, unspecified     Back injury     Rheumatoid arthritis, unspecified     Sciatica        Past Surgical History:  Past Surgical History:   Procedure Laterality Date    CYST REMOVAL N/A 10/15/2024    Procedure: EXCISION, CYST;  Surgeon: Alexandra Bird MD;  Location: [facility] OR;  Service: OB/GYN;  Laterality: N/A;    HYSTEROSCOPY WITH DILATION AND CURETTAGE OF UTERUS N/A 10/15/2024    Procedure: HYSTEROSCOPY, WITH DILATION AND CURETTAGE OF UTERUS;  Surgeon: Alexandra Bird MD;  Location: [facility] OR;  Service: OB/GYN;  Laterality:  N/A;         Family History:  No family history on file.    Social History:  Social History     Tobacco Use    Smoking status: Never    Smokeless tobacco: Never   Substance and Sexual Activity    Alcohol use: Never    Drug use: Never    Sexual activity: Not Currently        Review of Systems     Review of Systems   Constitutional:  Negative for chills, diaphoresis and fever.   HENT:  Negative for congestion.    Respiratory:  Negative for cough and shortness of breath.    Cardiovascular:  Negative for chest pain.   Gastrointestinal:  Negative for abdominal pain, diarrhea, nausea and vomiting.   Genitourinary:  Positive for vaginal bleeding. Negative for dysuria and vaginal discharge.   Musculoskeletal:  Negative for back pain.   Skin:  Negative for rash.   Neurological:  Positive for weakness, light-headedness and headaches. Negative for dizziness and syncope.   All other systems reviewed and are negative.       Physical Exam     Initial Vitals [12/27/24 1504]   BP Pulse Resp Temp SpO2   127/71 94 16 98.3 °F (36.8 °C) 100 %      MAP       --          Physical Exam  Nursing Notes and Vital Signs Reviewed.  Constitutional: Patient is in no acute distress. Well-developed and well-nourished.  Head: Atraumatic. Normocephalic.  Eyes: EOM intact. Conjunctivae are not pale. No scleral icterus.  ENT: Mucous membranes are moist.   Neck: Supple. Full ROM.   Cardiovascular: Regular rate. Regular rhythm. No murmurs, rubs, or gallops.   Pulmonary/Chest: No respiratory distress. Clear to auscultation bilaterally. No wheezing or rales.  Abdominal: Soft and non-distended.  There is no tenderness.  No rebound, guarding, or rigidity.   Musculoskeletal: Moves all extremities. No obvious deformities. No edema.   Skin: Warm and dry.  Neurological:  Alert, awake, and appropriate.  Normal speech.  No acute focal neurological deficits are appreciated.  Psychiatric: Normal affect. Good eye contact. Appropriate in content.        ED Course    Procedures  ED Vital Signs:  Vitals:    12/27/24 1504 12/27/24 2143 12/27/24 2145 12/27/24 2147   BP: 127/71 (!) 149/77 (!) 153/80 (!) 151/78   Pulse: 94 61 67 (!) 57   Resp: 16      Temp: 98.3 °F (36.8 °C)      TempSrc: Oral      SpO2: 100% 100% 100% 100%   Weight: 56.4 kg (124 lb 6.4 oz)       12/27/24 2230   BP: (!) 169/68   Pulse: 67   Resp:    Temp:    TempSrc:    SpO2: 99%   Weight:        Abnormal Lab Results:  Labs Reviewed   CBC W/ AUTO DIFFERENTIAL - Abnormal       Result Value    WBC 5.72      RBC 4.72      Hemoglobin 10.3 (*)     Hematocrit 35.4 (*)     MCV 75 (*)     MCH 21.8 (*)     MCHC 29.1 (*)     RDW 17.9 (*)     Platelets 406      MPV 9.8      Immature Granulocytes 0.3      Gran # (ANC) 3.9      Immature Grans (Abs) 0.02      Lymph # 1.2      Mono # 0.5      Eos # 0.1      Baso # 0.06      nRBC 0      Gran % 68.5      Lymph % 20.6      Mono % 8.6      Eosinophil % 1.0      Basophil % 1.0      Differential Method Automated     COMPREHENSIVE METABOLIC PANEL - Abnormal    Sodium 139      Potassium 3.8      Chloride 105      CO2 24      Glucose 85      BUN 5 (*)     Creatinine 0.8      Calcium 9.6      Total Protein 7.8      Albumin 4.4      Total Bilirubin 0.6      Alkaline Phosphatase 63      AST 15      ALT 12      eGFR >60      Anion Gap 10     URINALYSIS, REFLEX TO URINE CULTURE - Abnormal    Specimen UA Urine, Clean Catch      Color, UA Colorless (*)     Appearance, UA Clear      pH, UA 6.0      Specific Gravity, UA <1.005 (*)     Protein, UA Negative      Glucose, UA Negative      Ketones, UA Negative      Bilirubin (UA) Negative      Occult Blood UA 2+ (*)     Nitrite, UA Negative      Urobilinogen, UA Negative      Leukocytes, UA Negative      Narrative:     Specimen Source->Urine   URINALYSIS MICROSCOPIC - Abnormal    RBC, UA 32 (*)     WBC, UA 2      Microscopic Comment SEE COMMENT      Narrative:     Specimen Source->Urine   PREGNANCY TEST, URINE RAPID    Preg Test, Ur Negative       Narrative:     Specimen Source->Urine   TYPE & SCREEN    Group & Rh A POS      Indirect Loulou NEG      Specimen Outdate 12/30/2024 23:59          All Lab Results:  Results for orders placed or performed during the hospital encounter of 12/27/24   CBC auto differential    Collection Time: 12/27/24  4:13 PM   Result Value Ref Range    WBC 5.72 3.90 - 12.70 K/uL    RBC 4.72 4.00 - 5.40 M/uL    Hemoglobin 10.3 (L) 12.0 - 16.0 g/dL    Hematocrit 35.4 (L) 37.0 - 48.5 %    MCV 75 (L) 82 - 98 fL    MCH 21.8 (L) 27.0 - 31.0 pg    MCHC 29.1 (L) 32.0 - 36.0 g/dL    RDW 17.9 (H) 11.5 - 14.5 %    Platelets 406 150 - 450 K/uL    MPV 9.8 9.2 - 12.9 fL    Immature Granulocytes 0.3 0.0 - 0.5 %    Gran # (ANC) 3.9 1.8 - 7.7 K/uL    Immature Grans (Abs) 0.02 0.00 - 0.04 K/uL    Lymph # 1.2 1.0 - 4.8 K/uL    Mono # 0.5 0.3 - 1.0 K/uL    Eos # 0.1 0.0 - 0.5 K/uL    Baso # 0.06 0.00 - 0.20 K/uL    nRBC 0 0 /100 WBC    Gran % 68.5 38.0 - 73.0 %    Lymph % 20.6 18.0 - 48.0 %    Mono % 8.6 4.0 - 15.0 %    Eosinophil % 1.0 0.0 - 8.0 %    Basophil % 1.0 0.0 - 1.9 %    Differential Method Automated    Comprehensive metabolic panel    Collection Time: 12/27/24  4:13 PM   Result Value Ref Range    Sodium 139 136 - 145 mmol/L    Potassium 3.8 3.5 - 5.1 mmol/L    Chloride 105 95 - 110 mmol/L    CO2 24 23 - 29 mmol/L    Glucose 85 70 - 110 mg/dL    BUN 5 (L) 6 - 20 mg/dL    Creatinine 0.8 0.5 - 1.4 mg/dL    Calcium 9.6 8.7 - 10.5 mg/dL    Total Protein 7.8 6.0 - 8.4 g/dL    Albumin 4.4 3.5 - 5.2 g/dL    Total Bilirubin 0.6 0.1 - 1.0 mg/dL    Alkaline Phosphatase 63 40 - 150 U/L    AST 15 10 - 40 U/L    ALT 12 10 - 44 U/L    eGFR >60 >60 mL/min/1.73 m^2    Anion Gap 10 8 - 16 mmol/L   Type & Screen    Collection Time: 12/27/24  4:13 PM   Result Value Ref Range    Group & Rh A POS     Indirect Loulou NEG     Specimen Outdate 12/30/2024 23:59    Urinalysis, Reflex to Urine Culture Urine, Clean Catch    Collection Time: 12/27/24  4:15 PM    Specimen:  Urine   Result Value Ref Range    Specimen UA Urine, Clean Catch     Color, UA Colorless (A) Yellow, Straw, Rosanna    Appearance, UA Clear Clear    pH, UA 6.0 5.0 - 8.0    Specific Gravity, UA <1.005 (A) 1.005 - 1.030    Protein, UA Negative Negative    Glucose, UA Negative Negative    Ketones, UA Negative Negative    Bilirubin (UA) Negative Negative    Occult Blood UA 2+ (A) Negative    Nitrite, UA Negative Negative    Urobilinogen, UA Negative <2.0 EU/dL    Leukocytes, UA Negative Negative   Pregnancy, urine rapid (UPT)    Collection Time: 12/27/24  4:15 PM   Result Value Ref Range    Preg Test, Ur Negative    Urinalysis Microscopic    Collection Time: 12/27/24  4:15 PM   Result Value Ref Range    RBC, UA 32 (H) 0 - 4 /hpf    WBC, UA 2 0 - 5 /hpf    Microscopic Comment SEE COMMENT          Imaging Results:  Imaging Results              US Pelvis Comp with Transvag NON-OB (xpd) (Final result)  Result time 12/27/24 22:54:10   Procedure changed from US Pelvis Complete Non OB     Final result by Teetee Anguiano MD (12/27/24 22:54:10)                   Impression:      Possible hydrosalpinx      Electronically signed by: Teetee Anguiano  Date:    12/27/2024  Time:    22:54               Narrative:    EXAMINATION:  US PELVIS COMP WITH TRANSVAG NON-OB (XPD)    CLINICAL HISTORY:  vaginal bleeding;    TECHNIQUE:  Pelvic sonogram    COMPARISON:  October 2024    FINDINGS:  Uterine length 9 cm.  Endometrial stripe thickness 6 mm.  There may be hydrosalpinx bilateral.  Ovaries otherwise unremarkable with positive Doppler flow.  No sizable ascites.  Technique is transvaginal                                                The Emergency Provider reviewed the vital signs and test results, which are outlined above.     ED Discussion     10:10 PM: Discussed pt's case with Dr. Bird (OBYGN) who recommends pelvic US and dose of IV TXA. Pt can f/u in office, but US would be useful to know how to adjust her  norethindrone.    11:03 PM: Discussed pt's case with Dr. Bird (OBGYN) who reviewed U/S and recommends to decrease the norethindrone to 5 mg daily and f/u in office.    11:07 PM: Reassessed pt at this time. Discussed with pt all pertinent ED information and results. Discussed pt dx and plan of tx. Gave pt all f/u and return to the ED instructions. All questions and concerns were addressed at this time. Pt expresses understanding of information and instructions, and is comfortable with plan to discharge. Pt is stable for discharge.    I discussed with patient and/or family/caretaker that evaluation in the ED does not suggest any emergent or life threatening medical conditions requiring immediate intervention beyond what was provided in the ED, and I believe patient is safe for discharge.  Regardless, an unremarkable evaluation in the ED does not preclude the development or presence of a serious of life threatening condition. As such, patient was instructed to return immediately for any worsening or change in current symptoms.         Medical Decision Making  vaginal bleeding in nonpregnant female.  Chart reviewed.  She was here 2 months ago.  Hemoglobin got down to 8 at that time. OBGYN took her for a D&C.  She is currently taking norethindrone 10 mg daily and reports compliance.  She has been bleeding for 3 days.  She states she is changing a pad(s) every 1-2 hours.  She said she came in earlier this time compared to last time in October.  Her hemoglobin today is 10.  Vital signs are stable.  Orthostatics are within normal limits.  She states she was in contact with someone from Ob earlier today and was advised to present to the emergency department.  Discussed case with OBGYN, who recommended a dose of IV TXA as well as reducing her norethindrone to 5 mg daily and outpatient OBGYN follow up    Amount and/or Complexity of Data Reviewed  External Data Reviewed: notes.     Details: PMH, meds, allergies  reviewed  Labs: ordered. Decision-making details documented in ED Course.  Radiology: ordered. Decision-making details documented in ED Course.    Risk  OTC drugs.  Prescription drug management.  Decision regarding hospitalization.                ED Medication(s):  Medications   tranexamic acid (CYKLOKAPRON) 550 mg in 0.9% NaCl 100 mL (0 mg Intravenous Stopped 12/27/24 9067)       Discharge Medication List as of 12/27/2024 11:18 PM           Follow-up Information       Follow up with your OBGYN as soon as possible.               [facility] - Emergency Dept..    Specialty: Emergency Medicine  Why: As needed, If symptoms worsen  Contact information:  [id number] [facility] [id number] [phone]                               Scribe Attestation:   Scribe #1: I performed the above scribed service and the documentation accurately describes the services I performed. I attest to the accuracy of the note.     Attending:   Physician Attestation Statement for Scribe #1: I, Seng Arnold MD, personally performed the services described in this documentation, as scribed by Bonifacio Moser, in my presence, and it is both accurate and complete.           Clinical Impression       ICD-10-CM ICD-9-CM   1. Abnormal vaginal bleeding  N93.9 623.8   2. Anemia due to blood loss  D50.0 280.0       Disposition:   Disposition: Discharged  Condition: Stable       Seng Arnold MD  12/31/24 0723

## 2025-02-15 ENCOUNTER — HOSPITAL ENCOUNTER (EMERGENCY)
Facility: HOSPITAL | Age: 45
Discharge: HOME | End: 2025-02-15
Attending: STUDENT IN AN ORGANIZED HEALTH CARE EDUCATION/TRAINING PROGRAM
Payer: MEDICAID

## 2025-02-15 VITALS
HEART RATE: 84 BPM | RESPIRATION RATE: 20 BRPM | BODY MASS INDEX: 19.62 KG/M2 | WEIGHT: 125 LBS | OXYGEN SATURATION: 100 % | TEMPERATURE: 99 F | DIASTOLIC BLOOD PRESSURE: 88 MMHG | HEIGHT: 67 IN | SYSTOLIC BLOOD PRESSURE: 136 MMHG

## 2025-02-15 DIAGNOSIS — K08.89 PAIN, DENTAL: ICD-10-CM

## 2025-02-15 DIAGNOSIS — L73.9 FOLLICULITIS: Primary | ICD-10-CM

## 2025-02-15 LAB — B-HCG UR QL: NEGATIVE

## 2025-02-15 PROCEDURE — 81025 URINE PREGNANCY TEST: CPT | Performed by: NURSE PRACTITIONER

## 2025-02-15 PROCEDURE — 99284 EMERGENCY DEPT VISIT MOD MDM: CPT

## 2025-02-15 RX ORDER — AZELASTINE 1 MG/ML
SPRAY, METERED NASAL
COMMUNITY
Start: 2025-02-04

## 2025-02-15 RX ORDER — HYDROCODONE BITARTRATE AND ACETAMINOPHEN 7.5; 325 MG/1; MG/1
1 TABLET ORAL EVERY 6 HOURS PRN
Qty: 12 TABLET | Refills: 0 | Status: SHIPPED | OUTPATIENT
Start: 2025-02-15

## 2025-02-15 RX ORDER — HYDROCODONE BITARTRATE AND ACETAMINOPHEN 7.5; 325 MG/1; MG/1
1 TABLET ORAL EVERY 6 HOURS PRN
Qty: 12 TABLET | Refills: 0 | Status: SHIPPED | OUTPATIENT
Start: 2025-02-15 | End: 2025-02-15

## 2025-02-15 RX ORDER — ONDANSETRON 4 MG/1
4 TABLET, FILM COATED ORAL EVERY 6 HOURS PRN
Qty: 30 TABLET | Refills: 0 | Status: SHIPPED | OUTPATIENT
Start: 2025-02-15

## 2025-02-15 RX ORDER — CLINDAMYCIN HYDROCHLORIDE 300 MG/1
300 CAPSULE ORAL EVERY 6 HOURS
Qty: 40 CAPSULE | Refills: 0 | Status: SHIPPED | OUTPATIENT
Start: 2025-02-15 | End: 2025-02-15

## 2025-02-15 RX ORDER — SULFAMETHOXAZOLE AND TRIMETHOPRIM 800; 160 MG/1; MG/1
1 TABLET ORAL 2 TIMES DAILY
COMMUNITY
Start: 2025-01-17

## 2025-02-15 RX ORDER — CLINDAMYCIN HYDROCHLORIDE 300 MG/1
300 CAPSULE ORAL EVERY 6 HOURS
Qty: 40 CAPSULE | Refills: 0 | Status: SHIPPED | OUTPATIENT
Start: 2025-02-15 | End: 2025-02-25

## 2025-02-15 RX ORDER — OSELTAMIVIR PHOSPHATE 75 MG/1
75 CAPSULE ORAL 2 TIMES DAILY
COMMUNITY
Start: 2025-02-04

## 2025-02-15 RX ORDER — ONDANSETRON 8 MG/1
TABLET, ORALLY DISINTEGRATING ORAL
COMMUNITY

## 2025-02-15 RX ORDER — HYDROCODONE BITARTRATE AND ACETAMINOPHEN 5; 325 MG/1; MG/1
TABLET ORAL
COMMUNITY
Start: 2025-02-11 | End: 2025-02-15

## 2025-02-15 RX ORDER — ONDANSETRON 4 MG/1
4 TABLET, FILM COATED ORAL EVERY 6 HOURS PRN
Qty: 30 TABLET | Refills: 0 | Status: SHIPPED | OUTPATIENT
Start: 2025-02-15 | End: 2025-02-15

## 2025-02-15 RX ORDER — KETOROLAC TROMETHAMINE 10 MG/1
10 TABLET, FILM COATED ORAL EVERY 6 HOURS PRN
COMMUNITY
Start: 2025-01-17

## 2025-02-15 RX ORDER — PROMETHAZINE HYDROCHLORIDE AND DEXTROMETHORPHAN HYDROBROMIDE 6.25; 15 MG/5ML; MG/5ML
5 SYRUP ORAL EVERY 6 HOURS PRN
COMMUNITY
Start: 2025-02-04

## 2025-02-15 NOTE — DISCHARGE INSTRUCTIONS
Rest, increase fluids, lots of water and liquids.  Motrin as needed.  Call your dentist for office recheck.  Continue post tooth extraction discharge instructions per your dentist.  Warm soapy soaks for labia folliculitis.  The clindamycin should assist with your concerns for dental infection and labia folliculitis.  Call clinic for office recheck.  Return as needed  
alert

## 2025-02-15 NOTE — ED PROVIDER NOTES
Encounter Date: 2/15/2025       History     Chief Complaint   Patient presents with    Abscess     Patient reports recent staph infection with newly identified bump to left labia since Tuesday.  Reports pain to the area.  Patient also reports right lower dental pain since Tuesday after multiple teeth extracted.     POV to ED alone.  Patient complains of toothache for 4 days as well as lesion on the labia for 4 days.  Pending bed placement, main ED full.  States she has chronic dental pain, chronic tooth decay.  Reporting oral trauma 12 years ago.  States all her teeth are bad.  They chip and crack.  States this past week she had for teeth extracted from the right upper jaw.  She denies fever vomiting.  States the jaw is sore and hurting.  States she is not taking any antibiotics.  Also, states she has a history of staph infection in the past.  Reporting a sore on the vagina for 4 days as well.  No drainage.  No fever or vomiting.  No concerns for pregnancy     The history is provided by the patient. No  was used.     Review of patient's allergies indicates:   Allergen Reactions    Morphine Swelling and Hives    Tramadol Other (See Comments)     headache     Past Medical History:   Diagnosis Date    Anxiety disorder, unspecified     Back injury     Rheumatoid arthritis, unspecified     Sciatica      Past Surgical History:   Procedure Laterality Date    CYST REMOVAL N/A 10/15/2024    Procedure: EXCISION, CYST;  Surgeon: Alexandra Bird MD;  Location: [facility] OR;  Service: OB/GYN;  Laterality: N/A;    HYSTEROSCOPY WITH DILATION AND CURETTAGE OF UTERUS N/A 10/15/2024    Procedure: HYSTEROSCOPY, WITH DILATION AND CURETTAGE OF UTERUS;  Surgeon: Alexandra Bird MD;  Location: [facility] OR;  Service: OB/GYN;  Laterality: N/A;     No family history on file.  Social History[1]  Review of Systems   Constitutional:  Negative for chills and fever.   HENT:  Positive for dental problem.     Genitourinary:         Lesion labia   All other systems reviewed and are negative.      Physical Exam     Initial Vitals [02/15/25 1208]   BP Pulse Resp Temp SpO2   138/74 105 16 99.6 °F (37.6 °C) 100 %      MAP       --         Physical Exam    Nursing note and vitals reviewed.  Constitutional: She appears well-developed and well-nourished. No distress.   HENT:   Head: Normocephalic and atraumatic. Mouth/Throat: Oropharynx is clear and moist.   Multiple fractured teeth noted, multiple dental erosions to the gumline.  Recent tooth extraction right upper gum.  Post tooth extraction soft gum swelling.  No bleeding or any drainage   Eyes: Pupils are equal, round, and reactive to light.   Neck:   Normal range of motion.  Cardiovascular:  Normal rate and regular rhythm.           Pulmonary/Chest: Breath sounds normal. No respiratory distress.   Abdominal: Abdomen is soft. Bowel sounds are normal.   Genitourinary:    Genitourinary Comments: Noting small pea sized lesion left labia majora.  No appreciated abscess to incise and drain at present time.  Nicollette, RN present for exam.  Patient will be covered with clindamycin, she agrees with care     Musculoskeletal:         General: Normal range of motion.      Cervical back: Normal range of motion.     Neurological: She is alert and oriented to person, place, and time. GCS score is 15. GCS eye subscore is 4. GCS verbal subscore is 5. GCS motor subscore is 6.   Skin: Skin is warm and dry. Capillary refill takes less than 2 seconds.   Psychiatric: She has a normal mood and affect. Thought content normal.         ED Course   Procedures  Labs Reviewed   PREGNANCY TEST, URINE RAPID       Result Value    Preg Test, Ur Negative      Narrative:     Specimen Source->Urine          Imaging Results    None          Medications - No data to display  Medical Decision Making  Presents for evaluation of tooth pain and labia lesion.  See HPI  Differentials include but not limited to  dental pain, dental abscess, chronic pain, abscess, cellulitis, folliculitis  Discharged home, diagnosis dental pain status post tooth extraction, folliculitis.  Prescriptions for home use.  Instructed to Rest, increase fluids, lots of water and liquids.  Motrin as needed.  Call your dentist for office recheck.  Continue post tooth extraction discharge instructions per your dentist.  Warm soapy soaks for labia folliculitis.  The clindamycin should assist with your concerns for dental infection and labia folliculitis.  Call clinic for office recheck.  Return as needed. Agrees with care    Risk  OTC drugs.  Prescription drug management.               ED Course as of 02/15/25 1418   Sat Feb 15, 2025   1308 Pregnancy, urine rapid    Final resultCollected 02/15 12:39  hCG Qualitative, Urine Negative     [CP]      ED Course User Index  [CP] Judith Quezada NP                           Clinical Impression:  Final diagnoses:  [L73.9] Folliculitis - left labia (Primary)  [K08.89] Pain, dental          ED Disposition Condition    Discharge Stable          ED Prescriptions       Medication Sig Dispense Start Date End Date Auth. Provider    clindamycin (CLEOCIN) 300 MG capsule  (Status: Discontinued) Take 1 capsule (300 mg total) by mouth every 6 (six) hours. for 10 days 40 capsule 2/15/2025 2/15/2025 Judith Quezada NP    HYDROcodone-acetaminophen (NORCO) 7.5-325 mg per tablet  (Status: Discontinued) Take 1 tablet by mouth every 6 (six) hours as needed for Pain. 12 tablet 2/15/2025 2/15/2025 Judith Quezada NP    ondansetron (ZOFRAN) 4 MG tablet  (Status: Discontinued) Take 1 tablet (4 mg total) by mouth every 6 (six) hours as needed for Nausea. 30 tablet 2/15/2025 2/15/2025 Judith Quezada NP    clindamycin (CLEOCIN) 300 MG capsule Take 1 capsule (300 mg total) by mouth every 6 (six) hours. for 10 days 40 capsule 2/15/2025 2/25/2025 Judith Quezada NP     HYDROcodone-acetaminophen (NORCO) 7.5-325 mg per tablet Take 1 tablet by mouth every 6 (six) hours as needed for Pain. 12 tablet 2/15/2025 -- Judith Quezada NP    ondansetron (ZOFRAN) 4 MG tablet Take 1 tablet (4 mg total) by mouth every 6 (six) hours as needed for Nausea. 30 tablet 2/15/2025 -- Judith Quezada NP          Follow-up Information       Follow up With Specialties Details Why Contact Info    Your dentist  Call in 2 days                 [1]   Social History  Tobacco Use    Smoking status: Never    Smokeless tobacco: Never   Substance Use Topics    Alcohol use: Never    Drug use: Never        Judith Quezada NP  02/15/25 4841

## 2025-02-24 ENCOUNTER — PATIENT MESSAGE (OUTPATIENT)
Dept: OBSTETRICS AND GYNECOLOGY | Facility: CLINIC | Age: 45
End: 2025-02-24
Payer: MEDICAID

## 2025-02-28 ENCOUNTER — OFFICE VISIT (OUTPATIENT)
Dept: OBSTETRICS AND GYNECOLOGY | Facility: CLINIC | Age: 45
End: 2025-02-28
Payer: MEDICAID

## 2025-02-28 ENCOUNTER — LAB VISIT (OUTPATIENT)
Dept: LAB | Facility: HOSPITAL | Age: 45
End: 2025-02-28
Attending: STUDENT IN AN ORGANIZED HEALTH CARE EDUCATION/TRAINING PROGRAM
Payer: MEDICAID

## 2025-02-28 VITALS
DIASTOLIC BLOOD PRESSURE: 88 MMHG | BODY MASS INDEX: 19.17 KG/M2 | SYSTOLIC BLOOD PRESSURE: 138 MMHG | HEIGHT: 67 IN | WEIGHT: 122.13 LBS

## 2025-02-28 DIAGNOSIS — N92.0 MENORRHAGIA WITH REGULAR CYCLE: Primary | ICD-10-CM

## 2025-02-28 DIAGNOSIS — N92.0 MENORRHAGIA WITH REGULAR CYCLE: ICD-10-CM

## 2025-02-28 PROCEDURE — 99999 PR PBB SHADOW E&M-EST. PATIENT-LVL III: CPT | Mod: PBBFAC,,, | Performed by: STUDENT IN AN ORGANIZED HEALTH CARE EDUCATION/TRAINING PROGRAM

## 2025-02-28 PROCEDURE — 3075F SYST BP GE 130 - 139MM HG: CPT | Mod: CPTII,,, | Performed by: STUDENT IN AN ORGANIZED HEALTH CARE EDUCATION/TRAINING PROGRAM

## 2025-02-28 PROCEDURE — 3079F DIAST BP 80-89 MM HG: CPT | Mod: CPTII,,, | Performed by: STUDENT IN AN ORGANIZED HEALTH CARE EDUCATION/TRAINING PROGRAM

## 2025-02-28 PROCEDURE — 99213 OFFICE O/P EST LOW 20 MIN: CPT | Mod: PBBFAC | Performed by: STUDENT IN AN ORGANIZED HEALTH CARE EDUCATION/TRAINING PROGRAM

## 2025-02-28 PROCEDURE — 3008F BODY MASS INDEX DOCD: CPT | Mod: CPTII,,, | Performed by: STUDENT IN AN ORGANIZED HEALTH CARE EDUCATION/TRAINING PROGRAM

## 2025-02-28 PROCEDURE — 85240 CLOT FACTOR VIII AHG 1 STAGE: CPT | Performed by: STUDENT IN AN ORGANIZED HEALTH CARE EDUCATION/TRAINING PROGRAM

## 2025-02-28 PROCEDURE — 99213 OFFICE O/P EST LOW 20 MIN: CPT | Mod: S$PBB,,, | Performed by: STUDENT IN AN ORGANIZED HEALTH CARE EDUCATION/TRAINING PROGRAM

## 2025-02-28 PROCEDURE — 36415 COLL VENOUS BLD VENIPUNCTURE: CPT | Performed by: STUDENT IN AN ORGANIZED HEALTH CARE EDUCATION/TRAINING PROGRAM

## 2025-02-28 PROCEDURE — 1159F MED LIST DOCD IN RCRD: CPT | Mod: CPTII,,, | Performed by: STUDENT IN AN ORGANIZED HEALTH CARE EDUCATION/TRAINING PROGRAM

## 2025-02-28 RX ORDER — ARGIN/GLUT/CAHMB/COLLAG/MV-MIN 7G-7G-1.5G
POWDER IN PACKET (EA) ORAL
COMMUNITY

## 2025-03-03 LAB — FACT VIII ACT/NOR PPP: 204 % (ref 60–170)

## 2025-03-07 ENCOUNTER — RESULTS FOLLOW-UP (OUTPATIENT)
Dept: OBSTETRICS AND GYNECOLOGY | Facility: CLINIC | Age: 45
End: 2025-03-07

## 2025-03-07 DIAGNOSIS — R79.1 ELEVATED FACTOR VIII LEVEL: Primary | ICD-10-CM

## 2025-03-25 NOTE — PROGRESS NOTES
Subjective     Patient ID: Crissy Argueta is a 45 y.o. female MRN: [id number]     Chief Complaint:  No chief complaint on file.       History of Present Illness    Crissy Argueta is a 45 y.o. female here to discuss bleeding.     She was last seen in October of 2024, for hospital follow up.  While admitted to the hospital she was started on provera and received 1 unit of pRBC. She eventually underwent a D&C. Her bleeding stabilized and she was discharge home with provera 10mg for 10 days.  She was started on Aygestin for bleeding at that time.     She states that she is ready for surgery to fix her heavy menstrual bleeding. She reports that she is having heavy periods again. She is also having continued pelvic pain the week before menses and the week after. She is not wanting to take TXA or aygestin as she was told by another provider that she should stop taking it because of elevated Factor VIII labs.    HPI obtained from online patient questionnaire:  Vaginal Bleeding  This is a chronic problem. The current episode started more than 1 month ago. The problem occurs constantly. The problem has been rapidly worsening. The pain is moderate. The problem affects both sides. She is not pregnant. Associated symptoms include abdominal pain, anorexia, back pain, chills, a fever, headaches, nausea and painful intercourse. Pertinent negatives include no constipation, diarrhea, discolored urine, dysuria, flank pain, frequency, hematuria, rash, urgency or vomiting. The vaginal bleeding is heavier than menses. She has been passing clots. She has not been passing tissue. She has tried acetaminophen, antibiotics, heating pad and NSAIDs for the symptoms. The treatment provided no relief. She is sexually active. No, her partner does not have an STD. Her menstrual history has been irregular. Her past medical history is significant for a gynecological surgery, menorrhagia, metrorrhagia and perineal abscess. There is no history of an  abdominal surgery, a  section, an ectopic pregnancy, endometriosis, herpes simplex, miscarriage, ovarian cysts, PID, an STD, a terminated pregnancy or vaginosis.       GYN & OB History  OB History   OB History    Para Term  AB Living   3 3 3      SAB IAB Ectopic Multiple Live Births       3      # Outcome Date GA Lbr Jeronimo/2nd Weight Sex Type Anes PTL Lv   3 Term      Vag-Spont      2 Term      Vag-Spont      1 Term      Vag-Spont          Patient's last menstrual period was 2025 (exact date).   Last Pap Date: @lastpapdate  Age of Menarche:     Past Medical History:   Diagnosis Date    Anxiety disorder, unspecified     Back injury     Rheumatoid arthritis, unspecified     Sciatica      Past Surgical History:   Procedure Laterality Date    CYST REMOVAL N/A 10/15/2024    Procedure: EXCISION, CYST;  Surgeon: Alexandra Bird MD;  Location: [facility] OR;  Service: OB/GYN;  Laterality: N/A;    HYSTEROSCOPY WITH DILATION AND CURETTAGE OF UTERUS N/A 10/15/2024    Procedure: HYSTEROSCOPY, WITH DILATION AND CURETTAGE OF UTERUS;  Surgeon: Alexandra Bird MD;  Location: [facility] OR;  Service: OB/GYN;  Laterality: N/A;     Review of patient's allergies indicates:   Allergen Reactions    Morphine Swelling and Hives    Tramadol Other (See Comments)     headache     Current Outpatient Medications   Medication Instructions    myrkq-odot-XmAJT-collag-mv-min (JOLIE, WITH COLLAGEN,) 7-7-1.5 gram PwPk Take by mouth.    ARIPiprazole (ABILIFY) 5 MG Tab 1 tablet, Daily    azelastine (ASTELIN) 137 mcg (0.1 %) nasal spray SMARTSI Spray(s) Both Nares Every 12 Hours    ferrous sulfate (FEOSOL) 325 mg (65 mg iron) Tab tablet Take by mouth.    HYDROcodone-acetaminophen (NORCO) 7.5-325 mg per tablet 1 tablet, Oral, Every 6 hours PRN    ibuprofen (ADVIL,MOTRIN) 800 mg, Oral, Every 6 hours PRN    ketorolac (TORADOL) 10 mg, Every 6 hours PRN    norethindrone (AYGESTIN) 10 mg, Oral, Daily    ondansetron (ZOFRAN)  4 mg, Oral, Every 6 hours PRN    ondansetron (ZOFRAN-ODT) 8 MG TbDL Place under the tongue.    oseltamivir (TAMIFLU) 75 mg, 2 times daily    promethazine-dextromethorphan (PROMETHAZINE-DM) 6.25-15 mg/5 mL Syrp 5 mLs, Every 6 hours PRN    rOPINIRole (REQUIP) 0.5 mg, Nightly    sulfamethoxazole-trimethoprim 800-160mg (BACTRIM DS) 800-160 mg Tab 1 tablet, 2 times daily    tranexamic acid (LYSTEDA) 650 mg tablet Take by mouth.     Social History: She  reports that she has never smoked. She has never used smokeless tobacco. She reports that she does not drink alcohol and does not use drugs.  Family History: family history is not on file.     Review of Systems  Review of Systems   Constitutional:  Positive for chills and fever.   Gastrointestinal:  Positive for abdominal pain, anorexia and nausea. Negative for constipation, diarrhea and vomiting.   Genitourinary:  Positive for menorrhagia and vaginal bleeding. Negative for dysuria, flank pain, frequency, hematuria and urgency.   Musculoskeletal:  Positive for back pain.   Integumentary:  Negative for rash.   Neurological:  Positive for headaches.         Objective    Physical Exam:   Constitutional: She is oriented to person, place, and time. She appears well-developed and well-nourished. No distress.    HENT:   Head: Normocephalic and atraumatic.    Eyes: EOM are normal.     Cardiovascular:  Normal rate.             Pulmonary/Chest: Effort normal.        Abdominal: She exhibits no distension. There is no abdominal tenderness. There is no rebound and no guarding.                 Neurological: She is alert and oriented to person, place, and time.    Skin: Skin is warm and dry. She is not diaphoretic.    Psychiatric: She has a normal mood and affect. Her behavior is normal. Thought content normal.            Assessment and Plan   Crissy Argueta is an 45 y.o. year old female here for No chief complaint on file.    Menorrhagia with regular cycle  -     FACTOR 8 ASSAY; Future;  Expected date: 02/28/2025     Will plan for hysterectomy if Factor 8 assay is WNL. If not WNL patient will need to follow up with hematology and be cleared for surgery.    Sarah Garduno MD  Obstetrics and Gynecology  [facility] [place], [place]

## 2025-05-06 ENCOUNTER — HOSPITAL ENCOUNTER (EMERGENCY)
Facility: HOSPITAL | Age: 45
Discharge: HOME | End: 2025-05-06
Attending: EMERGENCY MEDICINE
Payer: MEDICAID

## 2025-05-06 ENCOUNTER — PATIENT MESSAGE (OUTPATIENT)
Dept: OBSTETRICS AND GYNECOLOGY | Facility: CLINIC | Age: 45
End: 2025-05-06
Payer: MEDICAID

## 2025-05-06 ENCOUNTER — TELEPHONE (OUTPATIENT)
Dept: OBSTETRICS AND GYNECOLOGY | Facility: HOSPITAL | Age: 45
End: 2025-05-06

## 2025-05-06 VITALS
OXYGEN SATURATION: 100 % | DIASTOLIC BLOOD PRESSURE: 80 MMHG | HEART RATE: 66 BPM | BODY MASS INDEX: 18.82 KG/M2 | TEMPERATURE: 98 F | SYSTOLIC BLOOD PRESSURE: 149 MMHG | RESPIRATION RATE: 20 BRPM | WEIGHT: 120.13 LBS

## 2025-05-06 DIAGNOSIS — N93.9 ABNORMAL UTERINE BLEEDING (AUB): Primary | ICD-10-CM

## 2025-05-06 DIAGNOSIS — N93.8 DYSFUNCTIONAL UTERINE BLEEDING: Primary | ICD-10-CM

## 2025-05-06 LAB
ABSOLUTE EOSINOPHIL (OHS): 0.09 K/UL
ABSOLUTE MONOCYTE (OHS): 0.43 K/UL (ref 0.3–1)
ABSOLUTE NEUTROPHIL COUNT (OHS): 3.63 K/UL (ref 1.8–7.7)
ALBUMIN SERPL BCP-MCNC: 4.2 G/DL (ref 3.5–5.2)
ALP SERPL-CCNC: 54 UNIT/L (ref 40–150)
ALT SERPL W/O P-5'-P-CCNC: 7 UNIT/L (ref 10–44)
ANION GAP (OHS): 9 MMOL/L (ref 8–16)
ANISOCYTOSIS BLD QL SMEAR: SLIGHT
AST SERPL-CCNC: 15 UNIT/L (ref 11–45)
BASOPHILS # BLD AUTO: 0.04 K/UL
BASOPHILS NFR BLD AUTO: 0.7 %
BILIRUB SERPL-MCNC: 0.5 MG/DL (ref 0.1–1)
BUN SERPL-MCNC: 16 MG/DL (ref 6–20)
CALCIUM SERPL-MCNC: 9.1 MG/DL (ref 8.7–10.5)
CHLORIDE SERPL-SCNC: 110 MMOL/L (ref 95–110)
CO2 SERPL-SCNC: 22 MMOL/L (ref 23–29)
CREAT SERPL-MCNC: 0.8 MG/DL (ref 0.5–1.4)
ERYTHROCYTE [DISTWIDTH] IN BLOOD BY AUTOMATED COUNT: 24 % (ref 11.5–14.5)
GFR SERPLBLD CREATININE-BSD FMLA CKD-EPI: >60 ML/MIN/1.73/M2
GLUCOSE SERPL-MCNC: 95 MG/DL (ref 70–110)
HCT VFR BLD AUTO: 34.4 % (ref 37–48.5)
HGB BLD-MCNC: 10.4 GM/DL (ref 12–16)
HYPOCHROMIA BLD QL SMEAR: NORMAL
IMM GRANULOCYTES # BLD AUTO: 0.02 K/UL (ref 0–0.04)
IMM GRANULOCYTES NFR BLD AUTO: 0.4 % (ref 0–0.5)
INDIRECT COOMBS: NORMAL
LYMPHOCYTES # BLD AUTO: 1.41 K/UL (ref 1–4.8)
MCH RBC QN AUTO: 24.1 PG (ref 27–31)
MCHC RBC AUTO-ENTMCNC: 30.2 G/DL (ref 32–36)
MCV RBC AUTO: 80 FL (ref 82–98)
NUCLEATED RBC (/100WBC) (OHS): 0 /100 WBC
OVALOCYTES BLD QL SMEAR: NORMAL
PLATELET # BLD AUTO: 348 K/UL (ref 150–450)
PLATELET BLD QL SMEAR: NORMAL
PMV BLD AUTO: 10.7 FL (ref 9.2–12.9)
POTASSIUM SERPL-SCNC: 3.3 MMOL/L (ref 3.5–5.1)
PROT SERPL-MCNC: 7.6 GM/DL (ref 6–8.4)
RBC # BLD AUTO: 4.32 M/UL (ref 4–5.4)
RELATIVE EOSINOPHIL (OHS): 1.6 %
RELATIVE LYMPHOCYTE (OHS): 25.1 % (ref 18–48)
RELATIVE MONOCYTE (OHS): 7.7 % (ref 4–15)
RELATIVE NEUTROPHIL (OHS): 64.5 % (ref 38–73)
RH BLD: NORMAL
SODIUM SERPL-SCNC: 141 MMOL/L (ref 136–145)
SPECIMEN OUTDATE: NORMAL
STOMATOCYTES BLD QL SMEAR: PRESENT
TARGETS BLD QL SMEAR: NORMAL
WBC # BLD AUTO: 5.62 K/UL (ref 3.9–12.7)

## 2025-05-06 PROCEDURE — 85025 COMPLETE CBC W/AUTO DIFF WBC: CPT | Performed by: PHYSICIAN ASSISTANT

## 2025-05-06 PROCEDURE — 82040 ASSAY OF SERUM ALBUMIN: CPT | Performed by: PHYSICIAN ASSISTANT

## 2025-05-06 PROCEDURE — 86900 BLOOD TYPING SEROLOGIC ABO: CPT | Performed by: PHYSICIAN ASSISTANT

## 2025-05-06 PROCEDURE — 99284 EMERGENCY DEPT VISIT MOD MDM: CPT | Mod: 25

## 2025-05-06 RX ORDER — TRANEXAMIC ACID 650 MG/1
1300 TABLET ORAL 3 TIMES DAILY
Qty: 42 TABLET | Refills: 0 | Status: SHIPPED | OUTPATIENT
Start: 2025-05-06 | End: 2025-05-13

## 2025-05-06 NOTE — FIRST PROVIDER EVALUATION
Medical screening examination initiated.  I have conducted a focused provider triage encounter, findings are as follows:    Brief history of present illness:  vaginal bleeding since April 24. She says she is going through 30 pads per day.    Vitals:    05/06/25 1708   BP: (!) 142/73   BP Location: Right arm   Pulse: 95   Resp: 20   SpO2: 99%   Weight: 54.5 kg (120 lb 2.4 oz)       Pertinent physical exam:  nad, alert    Brief workup plan:  initiate labs    Preliminary workup initiated; this workup will be continued and followed by the physician or advanced practice provider that is assigned to the patient when roomed.

## 2025-05-07 NOTE — DISCHARGE INSTRUCTIONS
Dr. Garduno will be reaching out to you for follow-up for possible hysterectomy    Return to emergency department if you develop:  - Sustained Fever >100.4 or low temperature <96.8   - Tachycardia (very high heart rate) or Pulse >90  - Hypotension (low blood pressure) a top number (systolic pressure) of <90 or a bottom number (diastolic pressure) of <40 or lower  - Hypertension (high blood pressure) a top number (systolic pressure) of >180 or a bottom number (diastolic pressure) of >120 or higher  - Severe pain not relieved with recommended pain control regimen  - Severe shortness of breath above baseline  - Severe nausea, vomiting, inability to tolerate oral feeding/hydration  - Altered mental status, abnormal interaction or behaviors   - If symptoms acutely worsen or do not improve  - Development of other worrisome symptom

## 2025-05-07 NOTE — ED PROVIDER NOTES
SCRIBE #1 NOTE: I, Tim Powell, am scribing for, and in the presence of, Reinaldo Sales MD. I have scribed the entire note.       History     Chief Complaint   Patient presents with    Vaginal Bleeding     Prolonged heavy vag bleeding. States she may need another blood transfusion. Endorses hx of the same. Has been bleeding since April 24, 2025.     Review of patient's allergies indicates:   Allergen Reactions    Morphine Swelling and Hives    Tramadol Other (See Comments)     headache         History of Present Illness     HPI    5/6/2025, 7:18 PM  History obtained from the patient and medical records      History of Present Illness: Crissy Argueta is a 45 y.o. female patient with a PMHx of anxiety, rheumatoid arthritis, anemia, and abnormal uterine bleeding who presents to the Emergency Department for evaluation of vaginal bleeding which began 04/24/2025. Pt describes bleeding which worsened 4 days ago. Pt states she's been getting weekly iron infusions due to her anemia which has been exacerbated by her past bleeding hx. Pt states she'd like to get a hysterectomy once she's stable enough. Pt was seen at [facility] recently. Pt has had tranexamic acid and provera. Symptoms are constant and moderate in severity. Bleeding worsens on standing or bending down. Associated sxs include fatigue. Patient denies any syncope, fever, dizziness, flank pain or discharge. Pt sees Dr. Garduno for OBGYN. No further complaints or concerns at this time.       Arrival mode: Personal Transportation    PCP: Vera, Primary Doctor        Past Medical History:  Past Medical History:   Diagnosis Date    Anxiety disorder, unspecified     Back injury     Rheumatoid arthritis, unspecified     Sciatica        Past Surgical History:  Past Surgical History:   Procedure Laterality Date    CYST REMOVAL N/A 10/15/2024    Procedure: EXCISION, CYST;  Surgeon: Alexandra Bird MD;  Location: [facility] OR;  Service: OB/GYN;   Laterality: N/A;    HYSTEROSCOPY WITH DILATION AND CURETTAGE OF UTERUS N/A 10/15/2024    Procedure: HYSTEROSCOPY, WITH DILATION AND CURETTAGE OF UTERUS;  Surgeon: Alexandra Bird MD;  Location: [facility];  Service: OB/GYN;  Laterality: N/A;         Family History:  No family history on file.    Social History:  Social History     Tobacco Use    Smoking status: Never    Smokeless tobacco: Never   Substance and Sexual Activity    Alcohol use: Never    Drug use: Never    Sexual activity: Not Currently        Review of Systems     Review of Systems   Constitutional:  Positive for fatigue. Negative for fever.   HENT:  Negative for sore throat.    Respiratory:  Negative for shortness of breath.    Cardiovascular:  Negative for chest pain.   Gastrointestinal:  Negative for nausea.   Genitourinary:  Positive for vaginal bleeding. Negative for dysuria, flank pain and vaginal discharge.   Musculoskeletal:  Negative for back pain.   Skin:  Negative for rash.   Neurological:  Negative for dizziness, syncope and weakness.   Hematological:  Does not bruise/bleed easily.   All other systems reviewed and are negative.       Physical Exam     Initial Vitals [05/06/25 1708]   BP Pulse Resp Temp SpO2   (!) 142/73 95 20 -- 99 %      MAP       --          Physical Exam  Nursing Notes and Vital Signs Reviewed.  Constitutional: Patient is in no acute distress. Well-developed and well-nourished.  Head: Atraumatic. Normocephalic.  Eyes: PERRL. EOM intact. Conjunctivae are not pale. No scleral icterus.  ENT: Mucous membranes are moist. Oropharynx is clear and symmetric.    Neck: Supple. Full ROM. No lymphadenopathy.  Cardiovascular: Regular rate. Regular rhythm. No murmurs, rubs, or gallops. Distal pulses are 2+ and symmetric.  Pulmonary/Chest: No respiratory distress. Clear to auscultation bilaterally. No wheezing or rales.  Abdominal: Soft and non-distended.  There is no tenderness.  No rebound, guarding, or rigidity. Good bowel  sounds.  Genitourinary: No CVA tenderness.  Musculoskeletal: Moves all extremities. No obvious deformities. No edema. No calf tenderness.  Skin: Warm and dry.  Neurological:  Alert, awake, and appropriate.  Normal speech.  No acute focal neurological deficits are appreciated.  Psychiatric: Normal affect. Good eye contact. Appropriate in content.     ED Course   Procedures  ED Vital Signs:  Vitals:    05/06/25 1708   BP: (!) 142/73   Pulse: 95   Resp: 20   SpO2: 99%   Weight: 54.5 kg (120 lb 2.4 oz)       Abnormal Lab Results:  Labs Reviewed   COMPREHENSIVE METABOLIC PANEL - Abnormal       Result Value    Sodium 141      Potassium 3.3 (*)     Chloride 110      CO2 22 (*)     Glucose 95      BUN 16      Creatinine 0.8      Calcium 9.1      Protein Total 7.6      Albumin 4.2      Bilirubin Total 0.5      ALP 54      AST 15      ALT 7 (*)     Anion Gap 9      eGFR >60     CBC WITH DIFFERENTIAL - Abnormal    WBC 5.62      RBC 4.32      HGB 10.4 (*)     HCT 34.4 (*)     MCV 80 (*)     MCH 24.1 (*)     MCHC 30.2 (*)     RDW 24.0 (*)     Platelet Count 348      MPV 10.7      Nucleated RBC 0      Neut % 64.5      Lymph % 25.1      Mono % 7.7      Eos % 1.6      Basophil % 0.7      Imm Grans % 0.4      Neut # 3.63      Lymph # 1.41      Mono # 0.43      Eos # 0.09      Baso # 0.04      Imm Grans # 0.02     CBC W/ AUTO DIFFERENTIAL    Narrative:     The following orders were created for panel order CBC auto differential.  Procedure                               Abnormality         Status                     ---------                               -----------         ------                     CBC with Differential[[id number]]       Abnormal            Final result                 Please view results for these tests on the individual orders.   MORPHOLOGY    Platelet Estimate Appears Normal      Anisocytosis Slight      Hypochromia Occasional      Ovalocytes Occasional      Target Cell Occasional      Stomatocytes Present      TYPE & SCREEN    Specimen Outdate 05/09/2025 23:59      Group & Rh A POS      Indirect Loulou NEG          All Lab Results:  Results for orders placed or performed during the hospital encounter of 05/06/25   Comprehensive metabolic panel    Collection Time: 05/06/25  5:48 PM   Result Value Ref Range    Sodium 141 136 - 145 mmol/L    Potassium 3.3 (L) 3.5 - 5.1 mmol/L    Chloride 110 95 - 110 mmol/L    CO2 22 (L) 23 - 29 mmol/L    Glucose 95 70 - 110 mg/dL    BUN 16 6 - 20 mg/dL    Creatinine 0.8 0.5 - 1.4 mg/dL    Calcium 9.1 8.7 - 10.5 mg/dL    Protein Total 7.6 6.0 - 8.4 gm/dL    Albumin 4.2 3.5 - 5.2 g/dL    Bilirubin Total 0.5 0.1 - 1.0 mg/dL    ALP 54 40 - 150 unit/L    AST 15 11 - 45 unit/L    ALT 7 (L) 10 - 44 unit/L    Anion Gap 9 8 - 16 mmol/L    eGFR >60 >60 mL/min/1.73/m2   CBC with Differential    Collection Time: 05/06/25  5:48 PM   Result Value Ref Range    WBC 5.62 3.90 - 12.70 K/uL    RBC 4.32 4.00 - 5.40 M/uL    HGB 10.4 (L) 12.0 - 16.0 gm/dL    HCT 34.4 (L) 37.0 - 48.5 %    MCV 80 (L) 82 - 98 fL    MCH 24.1 (L) 27.0 - 31.0 pg    MCHC 30.2 (L) 32.0 - 36.0 g/dL    RDW 24.0 (H) 11.5 - 14.5 %    Platelet Count 348 150 - 450 K/uL    MPV 10.7 9.2 - 12.9 fL    Nucleated RBC 0 <=0 /100 WBC    Neut % 64.5 38 - 73 %    Lymph % 25.1 18 - 48 %    Mono % 7.7 4 - 15 %    Eos % 1.6 <=8 %    Basophil % 0.7 <=1.9 %    Imm Grans % 0.4 0.0 - 0.5 %    Neut # 3.63 1.8 - 7.7 K/uL    Lymph # 1.41 1 - 4.8 K/uL    Mono # 0.43 0.3 - 1 K/uL    Eos # 0.09 <=0.5 K/uL    Baso # 0.04 <=0.2 K/uL    Imm Grans # 0.02 0.00 - 0.04 K/uL   Morphology    Collection Time: 05/06/25  5:48 PM    Specimen: Blood   Result Value Ref Range    Platelet Estimate Appears Normal      Anisocytosis Slight     Hypochromia Occasional     Ovalocytes Occasional     Target Cell Occasional     Stomatocytes Present    Type & Screen    Collection Time: 05/06/25  5:48 PM   Result Value Ref Range    Specimen Outdate 05/09/2025 23:59     Group & Rh A POS      Indirect Loulou NEG        Imaging Results:  Imaging Results    None        The EKG was ordered, reviewed, and independently interpreted by the ED provider.  Interpretation time: 5:14 PM  Rate: 90 BPM  Rhythm: normal sinus rhythm  Interpretation: No acute ST or T wave changes detected. No STEMI.                 The Emergency Provider reviewed the vital signs and test results, which are outlined above.     ED Discussion     7:24 PM:     Pt known to Dr. Loomis have discussed hysterectomy due to excessive vaginal bleeding. 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 But she said she was cleared by her hematologist oncologist at [facility] a month ago. They are just giving her iron transfusion to build up her iron levels before she has a hysterectomy. She reports this episode of vaginal  bleeding started at the end of April but has been very severe the past few days. She reports any time she stands up a golf ball size of blood comes out. Hemoglobin hematocrit stable today but she is asking for treatment. She really wants a hysterectomy.     Discussed pt's case with Dr. Pimentel (OBGYN) who recommends lysteda 650 mg (2 pills x3/day) and does not need IV since her hemoglobin is greater than 10 right now. Dr. Pimentel will reach out to Dr. Garduno's office for followup .    7:33 PM: Reassessed pt at this time. Discussed with patient and/or family/caretaker all pertinent ED information and results. Discussed pt dx and plan of tx. Gave the patient all f/u and return to the ED instructions. All questions and concerns were addressed at this time. Patient and/or family/caretaker expresses understanding of information and instructions, and is comfortable with plan to discharge. Pt is stable for discharge.     I discussed with patient and/or family/caretaker that evaluation in the ED does not suggest any emergent or life threatening medical conditions requiring immediate intervention  beyond what was provided in the ED, and I believe patient is safe for discharge.  Regardless, an unremarkable evaluation in the ED does not preclude the development or presence of a serious of life threatening condition. As such, I instructed that the patient is to return immediately for any worsening or change in current symptoms.         Medical Decision Making  Differential diagnosis:  Menorrhagia dysfunctional uterine bleeding, threatened miscarriage, subchorionic bleed, Fibroids, polyps, adenomyosis, AV malformation, endometritis, PID, vaginal discharge, endocrine disorder, coagulopathy, cancer or mass      Amount and/or Complexity of Data Reviewed  External Data Reviewed: labs and notes.    Risk  Prescription drug management.                ED Medication(s):  Medications - No data to display    New Prescriptions    TRANEXAMIC ACID (LYSTEDA) 650 MG TABLET    Take 2 tablets (1,300 mg total) by mouth 3 (three) times daily. for 7 days        Follow-up Information       Sarah Garduno MD. Schedule an appointment as soon as possible for a visit in 3 days.    Specialty: Obstetrics and Gynecology  Why: If she does not contact you.  Contact information:  [address] [address]  [facility] [address] [phone]                                 Scribe Attestation:   Scribe #1: I performed the above scribed service and the documentation accurately describes the services I performed. I attest to the accuracy of the note.     Attending:   Physician Attestation Statement for Scribe #1: I, Reinaldo Sales MD, personally performed the services described in this documentation, as scribed by Tim Powell, in my presence, and it is both accurate and complete.           Clinical Impression       ICD-10-CM ICD-9-CM   1. Dysfunctional uterine bleeding  N93.8 626.8       Disposition:   Disposition: Discharged  Condition: Stable        Reinaldo Sales MD  05/06/25 1937

## 2025-05-10 LAB
OHS QRS DURATION: 76 MS
OHS QTC CALCULATION: 437 MS

## 2025-06-13 ENCOUNTER — TELEPHONE (OUTPATIENT)
Dept: OBSTETRICS AND GYNECOLOGY | Facility: CLINIC | Age: 45
End: 2025-06-13
Payer: MEDICAID

## 2025-06-13 NOTE — TELEPHONE ENCOUNTER
Copied from CRM #[id number]. Topic: General Inquiry - Patient Advice  >> Jun 13, 2025 11:41 AM As[place]ion wrote:  .Type:  Needs Medical Advice    Who Called:  Crissy   Symptoms (please be specific):  Heavy bleeding    How long has patient had these symptoms:   48 hr  Pharmacy name and phone #:     Would the patient rather a call back or a response via [facility]?  Call back   Best Call Back Number:  [phone]  Additional Information:  Pt is calling in regards to getting a call back to discuss concerns pertaining to symptoms  Symptom: Vaginal Bleeding - Not Pregnant  Outcome: Transfer to a nurse or provider NOW!  Reason: Heavy bleeding    The caller rejected this outcome.

## 2025-06-13 NOTE — TELEPHONE ENCOUNTER
Pt called in regards to heavy bleeding, but she is changing 2 pads plus tampons every hour.  Pt states she did not want to go back ED. The ER doc's are  always prescribed the pills that does not work. Pt is requesting if an Order can be sent to ED stating that she need the IV med that slows down bleeding.       Savanna COLEMAN LPN  OB/GYN

## 2025-06-16 ENCOUNTER — TELEPHONE (OUTPATIENT)
Dept: OBSTETRICS AND GYNECOLOGY | Facility: CLINIC | Age: 45
End: 2025-06-16
Payer: MEDICAID

## 2025-06-16 ENCOUNTER — TELEPHONE (OUTPATIENT)
Dept: OBSTETRICS AND GYNECOLOGY | Facility: HOSPITAL | Age: 45
End: 2025-06-16
Payer: MEDICAID

## 2025-06-16 RX ORDER — MEDROXYPROGESTERONE ACETATE 10 MG/1
10 TABLET ORAL DAILY
Qty: 30 TABLET | Refills: 0 | Status: SHIPPED | OUTPATIENT
Start: 2025-06-16

## 2025-06-16 NOTE — TELEPHONE ENCOUNTER
Copied from CRM #[id number]. Topic: Medications - Pharmacy  >> Jun 16, 2025  1:48 PM Bhargavi wrote:  Type:  Pharmacy Calling to Clarify an RX    Name of Caller:Katarzyna  Pharmacy Name:medroxyPROGESTERone (PROVERA) 10 MG tablet  Prescription Name:[facility]  What do they need to clarify?:icd code  Best Call Back Number:[phone]  Additional Information:

## 2025-06-16 NOTE — TELEPHONE ENCOUNTER
1:35 PM  6/16/2025    Placed phone call to patient.  She reports that her bleeding has slowed down today.  She has been doing well with her iron infusions and reports that her primary care provider noted hemoglobin 12 recently.  She reports that due to the current bleeding she likely has dropped her counts.  She denies symptoms of blood loss anemia at this time.  Plan for hysterectomy in approximately 2 weeks.  We will send a prescription for Provera that time.  Other medications are not well tolerated by patient.     Sarah Garduno MD  Obstetrics and Gynecology  [facility] [place], [place]      ----- Message from Nurse Carbajal sent at 6/13/2025 12:50 PM CDT -----  Good Afternoon,     Pt called in regards to heavy bleeding, but she is changing 2 pads plus tampons every hour.  Pt states she did not want to go back ED. The ER doc's are  always prescribed the pills that does not work. Pt is requesting if an Order can be sent to ED stating that she need the IV med that slows down bleeding.     Please Advise   Savanna

## 2025-06-25 ENCOUNTER — LAB VISIT (OUTPATIENT)
Dept: LAB | Facility: HOSPITAL | Age: 45
End: 2025-06-25
Attending: PHYSICIAN ASSISTANT
Payer: MEDICAID

## 2025-06-25 ENCOUNTER — OFFICE VISIT (OUTPATIENT)
Dept: OBSTETRICS AND GYNECOLOGY | Facility: CLINIC | Age: 45
End: 2025-06-25
Payer: MEDICAID

## 2025-06-25 VITALS
SYSTOLIC BLOOD PRESSURE: 122 MMHG | DIASTOLIC BLOOD PRESSURE: 92 MMHG | WEIGHT: 116.38 LBS | BODY MASS INDEX: 18.27 KG/M2 | HEIGHT: 67 IN

## 2025-06-25 DIAGNOSIS — N93.9 ABNORMAL UTERINE BLEEDING (AUB): ICD-10-CM

## 2025-06-25 DIAGNOSIS — Z01.818 PREOP EXAMINATION: Primary | ICD-10-CM

## 2025-06-25 LAB
ABSOLUTE EOSINOPHIL (OHS): 0.11 K/UL
ABSOLUTE MONOCYTE (OHS): 0.79 K/UL (ref 0.3–1)
ABSOLUTE NEUTROPHIL COUNT (OHS): 3.5 K/UL (ref 1.8–7.7)
ALBUMIN SERPL BCP-MCNC: 4 G/DL (ref 3.5–5.2)
ALP SERPL-CCNC: 53 UNIT/L (ref 40–150)
ALT SERPL W/O P-5'-P-CCNC: 10 UNIT/L (ref 10–44)
ANION GAP (OHS): 7 MMOL/L (ref 8–16)
AST SERPL-CCNC: 13 UNIT/L (ref 11–45)
BASOPHILS # BLD AUTO: 0.04 K/UL
BASOPHILS NFR BLD AUTO: 0.6 %
BILIRUB SERPL-MCNC: 0.4 MG/DL (ref 0.1–1)
BUN SERPL-MCNC: 13 MG/DL (ref 6–20)
CALCIUM SERPL-MCNC: 8.4 MG/DL (ref 8.7–10.5)
CHLORIDE SERPL-SCNC: 107 MMOL/L (ref 95–110)
CO2 SERPL-SCNC: 25 MMOL/L (ref 23–29)
CREAT SERPL-MCNC: 0.8 MG/DL (ref 0.5–1.4)
ERYTHROCYTE [DISTWIDTH] IN BLOOD BY AUTOMATED COUNT: 20.3 % (ref 11.5–14.5)
GFR SERPLBLD CREATININE-BSD FMLA CKD-EPI: >60 ML/MIN/1.73/M2
GLUCOSE SERPL-MCNC: 115 MG/DL (ref 70–110)
HCT VFR BLD AUTO: 36.4 % (ref 37–48.5)
HGB BLD-MCNC: 11.5 GM/DL (ref 12–16)
IMM GRANULOCYTES # BLD AUTO: 0.02 K/UL (ref 0–0.04)
IMM GRANULOCYTES NFR BLD AUTO: 0.3 % (ref 0–0.5)
LYMPHOCYTES # BLD AUTO: 2.11 K/UL (ref 1–4.8)
MCH RBC QN AUTO: 27.6 PG (ref 27–31)
MCHC RBC AUTO-ENTMCNC: 31.6 G/DL (ref 32–36)
MCV RBC AUTO: 88 FL (ref 82–98)
NUCLEATED RBC (/100WBC) (OHS): 0 /100 WBC
PLATELET # BLD AUTO: 397 K/UL (ref 150–450)
PMV BLD AUTO: 11.1 FL (ref 9.2–12.9)
POTASSIUM SERPL-SCNC: 3.2 MMOL/L (ref 3.5–5.1)
PROT SERPL-MCNC: 6.5 GM/DL (ref 6–8.4)
RBC # BLD AUTO: 4.16 M/UL (ref 4–5.4)
RELATIVE EOSINOPHIL (OHS): 1.7 %
RELATIVE LYMPHOCYTE (OHS): 32.1 % (ref 18–48)
RELATIVE MONOCYTE (OHS): 12 % (ref 4–15)
RELATIVE NEUTROPHIL (OHS): 53.3 % (ref 38–73)
SODIUM SERPL-SCNC: 139 MMOL/L (ref 136–145)
WBC # BLD AUTO: 6.57 K/UL (ref 3.9–12.7)

## 2025-06-25 PROCEDURE — 1159F MED LIST DOCD IN RCRD: CPT | Mod: CPTII,,, | Performed by: STUDENT IN AN ORGANIZED HEALTH CARE EDUCATION/TRAINING PROGRAM

## 2025-06-25 PROCEDURE — 3080F DIAST BP >= 90 MM HG: CPT | Mod: CPTII,,, | Performed by: STUDENT IN AN ORGANIZED HEALTH CARE EDUCATION/TRAINING PROGRAM

## 2025-06-25 PROCEDURE — 85025 COMPLETE CBC W/AUTO DIFF WBC: CPT

## 2025-06-25 PROCEDURE — 99999 PR PBB SHADOW E&M-EST. PATIENT-LVL III: CPT | Mod: PBBFAC,,, | Performed by: STUDENT IN AN ORGANIZED HEALTH CARE EDUCATION/TRAINING PROGRAM

## 2025-06-25 PROCEDURE — 99214 OFFICE O/P EST MOD 30 MIN: CPT | Mod: S$PBB,,, | Performed by: STUDENT IN AN ORGANIZED HEALTH CARE EDUCATION/TRAINING PROGRAM

## 2025-06-25 PROCEDURE — 3074F SYST BP LT 130 MM HG: CPT | Mod: CPTII,,, | Performed by: STUDENT IN AN ORGANIZED HEALTH CARE EDUCATION/TRAINING PROGRAM

## 2025-06-25 PROCEDURE — 36415 COLL VENOUS BLD VENIPUNCTURE: CPT

## 2025-06-25 PROCEDURE — 99213 OFFICE O/P EST LOW 20 MIN: CPT | Mod: PBBFAC | Performed by: STUDENT IN AN ORGANIZED HEALTH CARE EDUCATION/TRAINING PROGRAM

## 2025-06-25 PROCEDURE — 3008F BODY MASS INDEX DOCD: CPT | Mod: CPTII,,, | Performed by: STUDENT IN AN ORGANIZED HEALTH CARE EDUCATION/TRAINING PROGRAM

## 2025-06-25 PROCEDURE — 84460 ALANINE AMINO (ALT) (SGPT): CPT

## 2025-06-25 NOTE — PROGRESS NOTES
History & Physical    SUBJECTIVE:     History of Present Illness:  Patient is a 45 y.o. y.o. female presents for pre-op visit for menorrhagia hysterectomy.     Past Medical History:   Diagnosis Date    Anxiety disorder, unspecified     Back injury     Rheumatoid arthritis, unspecified     Sciatica      Past Surgical History:   Procedure Laterality Date    CYST REMOVAL N/A 10/15/2024    Procedure: EXCISION, CYST;  Surgeon: Alexandra Bird MD;  Location: [facility] OR;  Service: OB/GYN;  Laterality: N/A;    HYSTEROSCOPY WITH DILATION AND CURETTAGE OF UTERUS N/A 10/15/2024    Procedure: HYSTEROSCOPY, WITH DILATION AND CURETTAGE OF UTERUS;  Surgeon: Alexandra Bird MD;  Location: [facility] OR;  Service: OB/GYN;  Laterality: N/A;     Social History[1]  No family history on file.  Review of patient's allergies indicates:   Allergen Reactions    Morphine Swelling and Hives    Tramadol Other (See Comments)     headache     Current Medications[2]         Review of Systems:  Constitutional: no fever or chills  Respiratory: no cough or shortness of breath  Cardiovascular: no chest pain or palpitations  Gastrointestinal: no nausea or vomiting, tolerating diet  Genitourinary: +vaginal bleeding  Hematologic/Lymphatic: no easy bruising or lymphadenopathy  Neurological: no seizures or tremors      OBJECTIVE:     Vitals:    06/25/25 0828   BP: (!) 122/92         Physical Exam:  General: well developed, well nourished, no distress  Head: normocephalic  Neck: supple, symmetrical, trachea midline  Lungs:  clear to auscultation bilaterally and normal respiratory effort  Chest Wall: no tenderness  Heart: regular rate and rhythm, S1, S2 normal, no murmur, rub or gallop  Abdomen: soft, non-tender non-distented; bowel sounds normal; no masses,  no organomegaly  Extremities: no cyanosis or edema, or clubbing  Pulses: 2+ and symmetric      Laboratory  pending    Diagnostic Results:  12/27/2024    EXAMINATION:  US PELVIS COMP WITH  TRANSVAG NON-OB (XPD)     CLINICAL HISTORY:  vaginal bleeding;     TECHNIQUE:  Pelvic sonogram     COMPARISON:  October 2024     FINDINGS:  Uterine length 9 cm.  Endometrial stripe thickness 6 mm.  There may be hydrosalpinx bilateral.  Ovaries otherwise unremarkable with positive Doppler flow.  No sizable ascites.  Technique is transvaginal     Impression:     Possible hydrosalpinx    ASSESSMENT/PLAN:   I have explained the risks, benefits, and alternatives of RA-TLHBS in detail. We discussed the possibility of needing laparotomy for specimen removal.   Specific risks to surgery include bleeding, infection, damage to surrounding organs including bowel & bladder, cardiac arrest, and death. The patient voices understanding and all questions have been answered.  The patient agrees to proceed as planned.  Consent forms for the procedure have been reviewed and signed by the patient.   Instructed that she will need a ride home from surgery and to remain NPO after midnight the night prior to procedure, but that she may have water up until 2 hours prior to surgery.    Antimicrobial prophylaxis: [facility]  SCDs  for surgical prophylaxis.  Schedule post-op appointment in 6 weeks after procedure.     Sarah Garduno MD  Obstetrics and Gynecology  [facility] [place], [place]    Answers submitted by the patient for this visit:  Gynecologic Exam Questionnaire  (Submitted on 6/24/2025)  Chief Complaint: Gynecologic exam  genital itching: Yes  pelvic pain: Yes  vaginal bleeding: Yes  Chronicity: chronic  Onset: more than 1 year ago  Frequency: daily  Progression since onset: waxing and waning  Pain severity: moderate  Affected side: left  Pregnant now?: No  abdominal pain: No  anorexia: No  back pain: No  chills: No  constipation: No  diarrhea: No  discolored urine: No  dysuria: No  fever: No  flank pain: No  frequency: No  headaches: Yes  hematuria: No  nausea: Yes  painful intercourse: No  rash: Yes  urgency: No  vomiting:  No  Please select the characteristics of your discharge: : white  Vaginal bleeding: spotting  Passing clots?: Yes  Passing tissue?: No  treatments tried: acetaminophen, antibiotics, anti-fungal cream, heating pad, NSAIDs, warm bath  Improvement on treatment: mild  Sexual activity: not sexually active  Partner with STD symptoms: no  Birth control: nothing  Menstrual history: irregular  STD: No  abdominal surgery: No   section: Yes  Ectopic pregnancy: No  Endometriosis: Yes  herpes simplex: No  gynecological surgery: Yes  menorrhagia: Yes  metrorrhagia: Yes  miscarriage: No  ovarian cysts: No  perineal abscess: Yes  PID: No  terminated pregnancy: No  vaginosis: Yes         [1]   Social History  Socioeconomic History    Marital status: Single   Tobacco Use    Smoking status: Never    Smokeless tobacco: Never   Substance and Sexual Activity    Alcohol use: Never    Drug use: Never    Sexual activity: Not Currently   [2]   Current Outpatient Medications   Medication Sig Dispense Refill    ferrous sulfate (FEOSOL) 325 mg (65 mg iron) Tab tablet Take by mouth.      ibuprofen (ADVIL,MOTRIN) 800 MG tablet Take 1 tablet (800 mg total) by mouth every 6 (six) hours as needed for Pain. 15 tablet 0    medroxyPROGESTERone (PROVERA) 10 MG tablet Take 1 tablet (10 mg total) by mouth once daily. 30 tablet 0    ondansetron (ZOFRAN) 4 MG tablet Take 1 tablet (4 mg total) by mouth every 6 (six) hours as needed for Nausea. 30 tablet 0    promethazine-dextromethorphan (PROMETHAZINE-DM) 6.25-15 mg/5 mL Syrp Take 5 mLs by mouth every 6 (six) hours as needed.      pooiq-ogvh-QdVPY-collag-mv-min (JOLIE, WITH COLLAGEN,) 7-7-1.5 gram PwPk Take by mouth. (Patient not taking: Reported on 2025)      ARIPiprazole (ABILIFY) 5 MG Tab Take 1 tablet by mouth once daily.      azelastine (ASTELIN) 137 mcg (0.1 %) nasal spray SMARTSI Spray(s) Both Nares Every 12 Hours (Patient not taking: Reported on 2025)       HYDROcodone-acetaminophen (NORCO) 7.5-325 mg per tablet Take 1 tablet by mouth every 6 (six) hours as needed for Pain. (Patient not taking: Reported on 6/25/2025) 12 tablet 0    ketorolac (TORADOL) 10 mg tablet Take 10 mg by mouth every 6 (six) hours as needed.      ondansetron (ZOFRAN-ODT) 8 MG TbDL Place under the tongue. (Patient not taking: Reported on 6/25/2025)      oseltamivir (TAMIFLU) 75 MG capsule Take 75 mg by mouth 2 (two) times daily.      rOPINIRole (REQUIP) 0.25 MG tablet Take 0.5 mg by mouth every evening. (Patient not taking: Reported on 10/30/2024)      sulfamethoxazole-trimethoprim 800-160mg (BACTRIM DS) 800-160 mg Tab Take 1 tablet by mouth 2 (two) times daily. (Patient not taking: Reported on 2/28/2025)       No current facility-administered medications for this visit.

## 2025-06-25 NOTE — H&P (VIEW-ONLY)
History & Physical    SUBJECTIVE:     History of Present Illness:  Patient is a 45 y.o. y.o. female presents for pre-op visit for menorrhagia hysterectomy.     Past Medical History:   Diagnosis Date    Anxiety disorder, unspecified     Back injury     Rheumatoid arthritis, unspecified     Sciatica      Past Surgical History:   Procedure Laterality Date    CYST REMOVAL N/A 10/15/2024    Procedure: EXCISION, CYST;  Surgeon: Alexandra Bird MD;  Location: [facility];  Service: OB/GYN;  Laterality: N/A;    HYSTEROSCOPY WITH DILATION AND CURETTAGE OF UTERUS N/A 10/15/2024    Procedure: HYSTEROSCOPY, WITH DILATION AND CURETTAGE OF UTERUS;  Surgeon: Alexandra Bird MD;  Location: [facility] OR;  Service: OB/GYN;  Laterality: N/A;     Social History[1]  No family history on file.  Review of patient's allergies indicates:   Allergen Reactions    Morphine Swelling and Hives    Tramadol Other (See Comments)     headache     Current Medications[2]         Review of Systems:  Constitutional: no fever or chills  Respiratory: no cough or shortness of breath  Cardiovascular: no chest pain or palpitations  Gastrointestinal: no nausea or vomiting, tolerating diet  Genitourinary: +vaginal bleeding  Hematologic/Lymphatic: no easy bruising or lymphadenopathy  Neurological: no seizures or tremors      OBJECTIVE:     Vitals:    06/25/25 0828   BP: (!) 122/92         Physical Exam:  General: well developed, well nourished, no distress  Head: normocephalic  Neck: supple, symmetrical, trachea midline  Lungs:  clear to auscultation bilaterally and normal respiratory effort  Chest Wall: no tenderness  Heart: regular rate and rhythm, S1, S2 normal, no murmur, rub or gallop  Abdomen: soft, non-tender non-distented; bowel sounds normal; no masses,  no organomegaly  Extremities: no cyanosis or edema, or clubbing  Pulses: 2+ and symmetric      Laboratory  pending    Diagnostic Results:  12/27/2024    EXAMINATION:  US PELVIS COMP WITH  TRANSVAG NON-OB (XPD)     CLINICAL HISTORY:  vaginal bleeding;     TECHNIQUE:  Pelvic sonogram     COMPARISON:  October 2024     FINDINGS:  Uterine length 9 cm.  Endometrial stripe thickness 6 mm.  There may be hydrosalpinx bilateral.  Ovaries otherwise unremarkable with positive Doppler flow.  No sizable ascites.  Technique is transvaginal     Impression:     Possible hydrosalpinx    ASSESSMENT/PLAN:   I have explained the risks, benefits, and alternatives of RA-TLHBS in detail. We discussed the possibility of needing laparotomy for specimen removal.   Specific risks to surgery include bleeding, infection, damage to surrounding organs including bowel & bladder, cardiac arrest, and death. The patient voices understanding and all questions have been answered.  The patient agrees to proceed as planned.  Consent forms for the procedure have been reviewed and signed by the patient.   Instructed that she will need a ride home from surgery and to remain NPO after midnight the night prior to procedure, but that she may have water up until 2 hours prior to surgery.    Antimicrobial prophylaxis: [facility]  SCDs  for surgical prophylaxis.  Schedule post-op appointment in 6 weeks after procedure.     Sarah Garduno MD  Obstetrics and Gynecology  [facility] [place], [place]    Answers submitted by the patient for this visit:  Gynecologic Exam Questionnaire  (Submitted on 6/24/2025)  Chief Complaint: Gynecologic exam  genital itching: Yes  pelvic pain: Yes  vaginal bleeding: Yes  Chronicity: chronic  Onset: more than 1 year ago  Frequency: daily  Progression since onset: waxing and waning  Pain severity: moderate  Affected side: left  Pregnant now?: No  abdominal pain: No  anorexia: No  back pain: No  chills: No  constipation: No  diarrhea: No  discolored urine: No  dysuria: No  fever: No  flank pain: No  frequency: No  headaches: Yes  hematuria: No  nausea: Yes  painful intercourse: No  rash: Yes  urgency: No  vomiting:  No  Please select the characteristics of your discharge: : white  Vaginal bleeding: spotting  Passing clots?: Yes  Passing tissue?: No  treatments tried: acetaminophen, antibiotics, anti-fungal cream, heating pad, NSAIDs, warm bath  Improvement on treatment: mild  Sexual activity: not sexually active  Partner with STD symptoms: no  Birth control: nothing  Menstrual history: irregular  STD: No  abdominal surgery: No   section: Yes  Ectopic pregnancy: No  Endometriosis: Yes  herpes simplex: No  gynecological surgery: Yes  menorrhagia: Yes  metrorrhagia: Yes  miscarriage: No  ovarian cysts: No  perineal abscess: Yes  PID: No  terminated pregnancy: No  vaginosis: Yes         [1]   Social History  Socioeconomic History    Marital status: Single   Tobacco Use    Smoking status: Never    Smokeless tobacco: Never   Substance and Sexual Activity    Alcohol use: Never    Drug use: Never    Sexual activity: Not Currently   [2]   Current Outpatient Medications   Medication Sig Dispense Refill    ferrous sulfate (FEOSOL) 325 mg (65 mg iron) Tab tablet Take by mouth.      ibuprofen (ADVIL,MOTRIN) 800 MG tablet Take 1 tablet (800 mg total) by mouth every 6 (six) hours as needed for Pain. 15 tablet 0    medroxyPROGESTERone (PROVERA) 10 MG tablet Take 1 tablet (10 mg total) by mouth once daily. 30 tablet 0    ondansetron (ZOFRAN) 4 MG tablet Take 1 tablet (4 mg total) by mouth every 6 (six) hours as needed for Nausea. 30 tablet 0    promethazine-dextromethorphan (PROMETHAZINE-DM) 6.25-15 mg/5 mL Syrp Take 5 mLs by mouth every 6 (six) hours as needed.      bhscb-oyvo-QlGNS-collag-mv-min (JOLIE, WITH COLLAGEN,) 7-7-1.5 gram PwPk Take by mouth. (Patient not taking: Reported on 2025)      ARIPiprazole (ABILIFY) 5 MG Tab Take 1 tablet by mouth once daily.      azelastine (ASTELIN) 137 mcg (0.1 %) nasal spray SMARTSI Spray(s) Both Nares Every 12 Hours (Patient not taking: Reported on 2025)       HYDROcodone-acetaminophen (NORCO) 7.5-325 mg per tablet Take 1 tablet by mouth every 6 (six) hours as needed for Pain. (Patient not taking: Reported on 6/25/2025) 12 tablet 0    ketorolac (TORADOL) 10 mg tablet Take 10 mg by mouth every 6 (six) hours as needed.      ondansetron (ZOFRAN-ODT) 8 MG TbDL Place under the tongue. (Patient not taking: Reported on 6/25/2025)      oseltamivir (TAMIFLU) 75 MG capsule Take 75 mg by mouth 2 (two) times daily.      rOPINIRole (REQUIP) 0.25 MG tablet Take 0.5 mg by mouth every evening. (Patient not taking: Reported on 10/30/2024)      sulfamethoxazole-trimethoprim 800-160mg (BACTRIM DS) 800-160 mg Tab Take 1 tablet by mouth 2 (two) times daily. (Patient not taking: Reported on 2/28/2025)       No current facility-administered medications for this visit.

## 2025-06-30 NOTE — CONSULTS
[facility] - Emergency Dept.  Obstetrics & Gynecology  Consult Note    Patient Name: Crissy Argueta  MRN: [id number]  Admission Date: 10/14/2024  Hospital Length of Stay: 0 days  Code Status: Full Code  Primary Care Provider: No, Primary Doctor  Principal Problem: <principal problem not specified>    Consults  Subjective:       History of Present Illness:  45 yo female with history of sciatica here with progressively worsening vaginal bleeding for the past 3 weeks. She states that she has regular monthly menses and she thought that this was what it was. But the bleeding continued and started to get heavier as the days went on. Last week she was prescribed TXA by her PCP but she had nausea with it and stopped taking it. She was seen last night at an ER in [place], left [place], and then came to [facility] for second opinion. In the 24 hours leading up to her arrival in our ED she reports going through 46 adult diapers (pack of 50 with only 4 left), passing out about 3 times in the last 24 hours, and constant blood clots. She reports some back pain, pelvic cramping, shortness of breath, and dizziness. She specfically denies fevers, chills, headaches, nausea, vomiting,  chest pain, bowel or bladder changes, and leg swelling.       No new subjective & objective note has been filed under this hospital service since the last note was generated.    Assessment/Plan:     No new Assessment & Plan notes have been filed under this hospital service since the last note was generated.  Service: Obstetrics and Gynecology      See Admission H&P for full consult note.     Thank you for your consult.  Will admit patient to GYN service    Sarah Garduno MD  Obstetrics & Gynecology  [facility] - Emergency Dept.        
No

## 2025-06-30 NOTE — PRE-PROCEDURE INSTRUCTIONS
Pre op instructions reviewed with PATIENT over telephone, verbalized understanding.    Procedure Date: 7/2/25  Arrival Time:  TBD; We will call you after 2pm the day before your procedure with your arrival time.    Address:   [facility] (Off [facility] [place] [facility] Building on the left)  [address] [place] [address] [place] [id number]  >>>Please enter through front entrance Lobby of 1st floor to Registration desk<<<        !!!INSTRUCTIONS IMPORTANT!!!  NO FOOD, DRINK OR TOBACCO PRODUCTS AFTER MIDNIGHT THE NIGHT BEFORE SURGERY.     Do not eat after 12 midnight, Do not smoke or use chewing tobacco after 12 midnight!  OK to brush teeth, but no gum, candy, or mints!      >>>MEDICATION INSTRUCTIONS<<<: Morning of Surgery, take small sip of water with ONLY these medications:  NONE      *ADHD Medication: Stop taking ADHD/ADD medications 48 hrs prior to surgery, as this can affect the anesthesia used.     *Diabetic/ Prediabetic Patients: !!!If you take diabetic or weight loss medication, Do NOT take morning of surgery unless instructed by Doctor!!!  Metformin to be stopped 24 hrs prior to surgery.   Long Acting Insulin Instructions: HOLD the night before surgery unless instructed differently by Provider!  Ozempic/ Mounjaro/ Wegovy/ Trulicity/ Semaglutide injections or weight loss medication to be stopped 7 days prior to surgery.    If you take Ozempic/ Mounjaro / Wegovy / Trulicity / Semaglutide, Tirzepatide any weight loss injections OR PHENTERMINE --->>> PLEASE LET US KNOW IMMEDIATELY, as these medications need to be stopped 7 days prior to surgery!      !!!STOP ALL Aspirins, NSAIDS, WEIGHT LOSS INJECTIONS/PILLS, Herbal supplements, & Vitamins 7 DAYS BEFORE SURGERY!!!    ____  Avoid Alcoholic beverages 3 days prior to surgery, as it can thin the blood.  ____  NO Acrylic/fake nails or nail polish worn day of surgery (specifically hand/arm & foot surgeries).  ____  NO powder, lotions, deodorants, oils or cream on  body.  ____  Remove all jewelry & piercings & foreign objects before arrival & leave at home.  ____  Remove Dentures, Hearing Aids & Contact Lens prior to surgery.  ____  Bring photo ID and insurance information to hospital (Leave Valuables at Home).  ____  If going home the same day, arrange for a ride home. You will not be able to drive for 24 hrs if Anesthesia was used.   ____  Females (ages 11-60): may need to give a urine sample the morning of surgery; please see Pre op Nurse prior to using the restroom.  ____  Males: Stop ED medications (Viagra, Cialis) 24 hrs prior to surgery.  ____  Wear clean, loose fitting clothing to allow for dressings/ bandages.      Bathing Instructions:    -Shower with anti-bacterial Soap (Hibiclens or Dial) the night before surgery and the morning of.   -Do not use Hibiclens on your face or genitals.   -Apply clean clothes after shower.  -Do not shave your face or body 2 days prior to surgery unless instructed otherwise by your Surgeon.  -Do not apply lotions, creams, powders, oils or deodorant after the morning shower.     -Do not shave your pubic area 7 days prior to surgery (GYN PATIENTS)    [facility] Visitor/Ride Policy:  Only 2 adults allowed in pre op/recovery area during your procedure. You MUST HAVE A RIDE HOME from a responsible adult that you know and trust. Medical Transport, Uber or Lyft can ONLY be used if patient has a responsible adult to accompany them during ride home.       *Signs and symptoms of Infection Before or After Surgery:               !!!If you experience any fever, chills, nausea/ vomiting, foul odor/ excessive drainage from surgical site, flu-like symptoms, new wounds or cuts, PLEASE CALL THE SURGEON OFFICE at [phone] or SEND MESSAGE THROUGH [employer] PORTAL!!!     *If you are running late the morning of surgery, please call the Hospital Surgery Dept @ [phone].     *Billing questions:  [phone]     Thank you,  -[facility] Pre  Admit Dept.  [phone] or [phone]  M-F 7:30 am-4:00 pm (Closed Major Holidays)

## 2025-07-01 NOTE — PRE-PROCEDURE INSTRUCTIONS
Called and spoke with Crissy about the following:     Please arrive to [facility] ([facility] at 1000 on 7/2/25 for your scheduled procedure.  Address: [address] [place] [address] [place] [id number] (2nd Building on left, 1st Floor Lobby)    NO FOOD, DRINK OR TOBACCO PRODUCTS AFTER MIDNIGHT THE NIGHT BEFORE SURGERY.     Thank you,  -[facility] Pre Admit Dept.  Mon-Fri 7:30 am - 4 pm [phone]

## 2025-07-02 ENCOUNTER — ANESTHESIA EVENT (OUTPATIENT)
Dept: SURGERY | Facility: HOSPITAL | Age: 45
End: 2025-07-02
Payer: MEDICAID

## 2025-07-02 ENCOUNTER — ANESTHESIA (OUTPATIENT)
Dept: SURGERY | Facility: HOSPITAL | Age: 45
End: 2025-07-02
Payer: MEDICAID

## 2025-07-02 ENCOUNTER — HOSPITAL ENCOUNTER (OUTPATIENT)
Facility: HOSPITAL | Age: 45
Discharge: HOME | End: 2025-07-02
Attending: STUDENT IN AN ORGANIZED HEALTH CARE EDUCATION/TRAINING PROGRAM | Admitting: STUDENT IN AN ORGANIZED HEALTH CARE EDUCATION/TRAINING PROGRAM
Payer: MEDICAID

## 2025-07-02 VITALS
WEIGHT: 116.38 LBS | HEIGHT: 67 IN | HEART RATE: 62 BPM | RESPIRATION RATE: 19 BRPM | DIASTOLIC BLOOD PRESSURE: 71 MMHG | BODY MASS INDEX: 18.27 KG/M2 | TEMPERATURE: 98 F | SYSTOLIC BLOOD PRESSURE: 114 MMHG | OXYGEN SATURATION: 99 %

## 2025-07-02 DIAGNOSIS — N92.4 EXCESSIVE BLEEDING IN PREMENOPAUSAL PERIOD: ICD-10-CM

## 2025-07-02 DIAGNOSIS — N92.0 MENORRHAGIA: ICD-10-CM

## 2025-07-02 DIAGNOSIS — N93.9 ABNORMAL UTERINE BLEEDING (AUB): ICD-10-CM

## 2025-07-02 DIAGNOSIS — Z90.710 S/P HYSTERECTOMY: ICD-10-CM

## 2025-07-02 LAB
B-HCG UR QL: NEGATIVE
CTP QC/QA: YES
INDIRECT COOMBS: NORMAL
RH BLD: NORMAL
SPECIMEN OUTDATE: NORMAL

## 2025-07-02 PROCEDURE — 25000003 PHARM REV CODE 250: Performed by: ANESTHESIOLOGY

## 2025-07-02 PROCEDURE — 37000009 HC ANESTHESIA EA ADD 15 MINS: Performed by: STUDENT IN AN ORGANIZED HEALTH CARE EDUCATION/TRAINING PROGRAM

## 2025-07-02 PROCEDURE — 71000033 HC RECOVERY, INTIAL HOUR: Performed by: STUDENT IN AN ORGANIZED HEALTH CARE EDUCATION/TRAINING PROGRAM

## 2025-07-02 PROCEDURE — 63600175 PHARM REV CODE 636 W HCPCS: Performed by: ANESTHESIOLOGY

## 2025-07-02 PROCEDURE — 36000712 HC OR TIME LEV V 1ST 15 MIN: Performed by: STUDENT IN AN ORGANIZED HEALTH CARE EDUCATION/TRAINING PROGRAM

## 2025-07-02 PROCEDURE — 27800903 OPTIME MED/SURG SUP & DEVICES OTHER IMPLANTS: Performed by: STUDENT IN AN ORGANIZED HEALTH CARE EDUCATION/TRAINING PROGRAM

## 2025-07-02 PROCEDURE — 36000713 HC OR TIME LEV V EA ADD 15 MIN: Performed by: STUDENT IN AN ORGANIZED HEALTH CARE EDUCATION/TRAINING PROGRAM

## 2025-07-02 PROCEDURE — 58573 TLH W/T/O UTERUS OVER 250 G: CPT | Mod: ,,, | Performed by: STUDENT IN AN ORGANIZED HEALTH CARE EDUCATION/TRAINING PROGRAM

## 2025-07-02 PROCEDURE — 27201423 OPTIME MED/SURG SUP & DEVICES STERILE SUPPLY: Performed by: STUDENT IN AN ORGANIZED HEALTH CARE EDUCATION/TRAINING PROGRAM

## 2025-07-02 PROCEDURE — 88307 TISSUE EXAM BY PATHOLOGIST: CPT | Mod: TC | Performed by: STUDENT IN AN ORGANIZED HEALTH CARE EDUCATION/TRAINING PROGRAM

## 2025-07-02 PROCEDURE — 25000003 PHARM REV CODE 250: Performed by: STUDENT IN AN ORGANIZED HEALTH CARE EDUCATION/TRAINING PROGRAM

## 2025-07-02 PROCEDURE — 63600175 PHARM REV CODE 636 W HCPCS: Performed by: NURSE ANESTHETIST, CERTIFIED REGISTERED

## 2025-07-02 PROCEDURE — C2628 CATHETER, OCCLUSION: HCPCS | Performed by: STUDENT IN AN ORGANIZED HEALTH CARE EDUCATION/TRAINING PROGRAM

## 2025-07-02 PROCEDURE — 25000003 PHARM REV CODE 250: Performed by: NURSE ANESTHETIST, CERTIFIED REGISTERED

## 2025-07-02 PROCEDURE — 81025 URINE PREGNANCY TEST: CPT | Performed by: STUDENT IN AN ORGANIZED HEALTH CARE EDUCATION/TRAINING PROGRAM

## 2025-07-02 PROCEDURE — 71000015 HC POSTOP RECOV 1ST HR: Performed by: STUDENT IN AN ORGANIZED HEALTH CARE EDUCATION/TRAINING PROGRAM

## 2025-07-02 PROCEDURE — 37000008 HC ANESTHESIA 1ST 15 MINUTES: Performed by: STUDENT IN AN ORGANIZED HEALTH CARE EDUCATION/TRAINING PROGRAM

## 2025-07-02 PROCEDURE — 86850 RBC ANTIBODY SCREEN: CPT | Performed by: STUDENT IN AN ORGANIZED HEALTH CARE EDUCATION/TRAINING PROGRAM

## 2025-07-02 DEVICE — PATCH AMNION DUAL LAYER 4X8CM: Type: IMPLANTABLE DEVICE | Site: VAGINA | Status: FUNCTIONAL

## 2025-07-02 RX ORDER — DIPHENHYDRAMINE HCL 25 MG
25 CAPSULE ORAL EVERY 4 HOURS PRN
Status: DISCONTINUED | OUTPATIENT
Start: 2025-07-02 | End: 2025-07-02 | Stop reason: HOSPADM

## 2025-07-02 RX ORDER — ONDANSETRON HYDROCHLORIDE 2 MG/ML
INJECTION, SOLUTION INTRAMUSCULAR; INTRAVENOUS
Status: DISCONTINUED | OUTPATIENT
Start: 2025-07-02 | End: 2025-07-02

## 2025-07-02 RX ORDER — DEXAMETHASONE SODIUM PHOSPHATE 4 MG/ML
INJECTION, SOLUTION INTRA-ARTICULAR; INTRALESIONAL; INTRAMUSCULAR; INTRAVENOUS; SOFT TISSUE
Status: DISCONTINUED | OUTPATIENT
Start: 2025-07-02 | End: 2025-07-02

## 2025-07-02 RX ORDER — FENTANYL CITRATE 50 UG/ML
25 INJECTION, SOLUTION INTRAMUSCULAR; INTRAVENOUS EVERY 5 MIN PRN
Status: COMPLETED | OUTPATIENT
Start: 2025-07-02 | End: 2025-07-02

## 2025-07-02 RX ORDER — LIDOCAINE HYDROCHLORIDE 20 MG/ML
INJECTION, SOLUTION EPIDURAL; INFILTRATION; INTRACAUDAL; PERINEURAL
Status: DISCONTINUED | OUTPATIENT
Start: 2025-07-02 | End: 2025-07-02

## 2025-07-02 RX ORDER — ONDANSETRON HYDROCHLORIDE 2 MG/ML
4 INJECTION, SOLUTION INTRAVENOUS DAILY PRN
Status: DISCONTINUED | OUTPATIENT
Start: 2025-07-02 | End: 2025-07-02 | Stop reason: HOSPADM

## 2025-07-02 RX ORDER — EPHEDRINE SULFATE 50 MG/ML
INJECTION, SOLUTION INTRAVENOUS
Status: DISCONTINUED | OUTPATIENT
Start: 2025-07-02 | End: 2025-07-02

## 2025-07-02 RX ORDER — CEFAZOLIN 2 G/1
2 INJECTION, POWDER, FOR SOLUTION INTRAMUSCULAR; INTRAVENOUS
Status: DISCONTINUED | OUTPATIENT
Start: 2025-07-02 | End: 2025-07-02 | Stop reason: HOSPADM

## 2025-07-02 RX ORDER — CEFAZOLIN SODIUM 1 G/3ML
INJECTION, POWDER, FOR SOLUTION INTRAMUSCULAR; INTRAVENOUS
Status: DISCONTINUED | OUTPATIENT
Start: 2025-07-02 | End: 2025-07-02

## 2025-07-02 RX ORDER — CHLORHEXIDINE GLUCONATE ORAL RINSE 1.2 MG/ML
10 SOLUTION DENTAL
Status: DISCONTINUED | OUTPATIENT
Start: 2025-07-02 | End: 2025-07-02 | Stop reason: HOSPADM

## 2025-07-02 RX ORDER — OXYCODONE AND ACETAMINOPHEN 5; 325 MG/1; MG/1
1 TABLET ORAL
Status: DISCONTINUED | OUTPATIENT
Start: 2025-07-02 | End: 2025-07-02 | Stop reason: HOSPADM

## 2025-07-02 RX ORDER — HYDROCODONE BITARTRATE AND ACETAMINOPHEN 5; 325 MG/1; MG/1
1 TABLET ORAL EVERY 4 HOURS PRN
Refills: 0 | Status: DISCONTINUED | OUTPATIENT
Start: 2025-07-02 | End: 2025-07-02 | Stop reason: HOSPADM

## 2025-07-02 RX ORDER — DIPHENHYDRAMINE HYDROCHLORIDE 50 MG/ML
25 INJECTION, SOLUTION INTRAMUSCULAR; INTRAVENOUS EVERY 4 HOURS PRN
Status: DISCONTINUED | OUTPATIENT
Start: 2025-07-02 | End: 2025-07-02 | Stop reason: HOSPADM

## 2025-07-02 RX ORDER — ONDANSETRON 8 MG/1
8 TABLET, ORALLY DISINTEGRATING ORAL EVERY 8 HOURS PRN
Status: DISCONTINUED | OUTPATIENT
Start: 2025-07-02 | End: 2025-07-02 | Stop reason: HOSPADM

## 2025-07-02 RX ORDER — MIDAZOLAM HYDROCHLORIDE 1 MG/ML
INJECTION INTRAMUSCULAR; INTRAVENOUS
Status: DISCONTINUED | OUTPATIENT
Start: 2025-07-02 | End: 2025-07-02

## 2025-07-02 RX ORDER — FAMOTIDINE 20 MG/1
20 TABLET, FILM COATED ORAL
Status: COMPLETED | OUTPATIENT
Start: 2025-07-02 | End: 2025-07-02

## 2025-07-02 RX ORDER — IBUPROFEN 800 MG/1
800 TABLET, FILM COATED ORAL EVERY 8 HOURS PRN
Qty: 30 TABLET | Refills: 0 | Status: SHIPPED | OUTPATIENT
Start: 2025-07-02

## 2025-07-02 RX ORDER — ROCURONIUM BROMIDE 10 MG/ML
INJECTION, SOLUTION INTRAVENOUS
Status: DISCONTINUED | OUTPATIENT
Start: 2025-07-02 | End: 2025-07-02

## 2025-07-02 RX ORDER — FENTANYL CITRATE 50 UG/ML
INJECTION, SOLUTION INTRAMUSCULAR; INTRAVENOUS
Status: DISCONTINUED | OUTPATIENT
Start: 2025-07-02 | End: 2025-07-02

## 2025-07-02 RX ORDER — ONDANSETRON HYDROCHLORIDE 2 MG/ML
4 INJECTION, SOLUTION INTRAVENOUS ONCE AS NEEDED
Status: COMPLETED | OUTPATIENT
Start: 2025-07-02 | End: 2025-07-02

## 2025-07-02 RX ORDER — SODIUM CHLORIDE 9 MG/ML
INJECTION, SOLUTION INTRAVENOUS CONTINUOUS
Status: DISCONTINUED | OUTPATIENT
Start: 2025-07-02 | End: 2025-07-02 | Stop reason: HOSPADM

## 2025-07-02 RX ORDER — OXYCODONE AND ACETAMINOPHEN 5; 325 MG/1; MG/1
1 TABLET ORAL EVERY 4 HOURS PRN
Qty: 15 TABLET | Refills: 0 | Status: SHIPPED | OUTPATIENT
Start: 2025-07-02 | End: 2025-07-07 | Stop reason: ALTCHOICE

## 2025-07-02 RX ORDER — DEXMEDETOMIDINE HYDROCHLORIDE 100 UG/ML
INJECTION, SOLUTION INTRAVENOUS
Status: DISCONTINUED | OUTPATIENT
Start: 2025-07-02 | End: 2025-07-02

## 2025-07-02 RX ORDER — PROPOFOL 10 MG/ML
VIAL (ML) INTRAVENOUS
Status: DISCONTINUED | OUTPATIENT
Start: 2025-07-02 | End: 2025-07-02

## 2025-07-02 RX ORDER — HYDROMORPHONE HYDROCHLORIDE 2 MG/ML
0.2 INJECTION, SOLUTION INTRAMUSCULAR; INTRAVENOUS; SUBCUTANEOUS EVERY 5 MIN PRN
Status: DISCONTINUED | OUTPATIENT
Start: 2025-07-02 | End: 2025-07-02 | Stop reason: HOSPADM

## 2025-07-02 RX ADMIN — OXYCODONE HYDROCHLORIDE AND ACETAMINOPHEN 1 TABLET: 5; 325 TABLET ORAL at 02:07

## 2025-07-02 RX ADMIN — PROPOFOL 120 MG: 10 INJECTION, EMULSION INTRAVENOUS at 11:07

## 2025-07-02 RX ADMIN — CEFAZOLIN 2 G: 330 INJECTION, POWDER, FOR SOLUTION INTRAMUSCULAR; INTRAVENOUS at 11:07

## 2025-07-02 RX ADMIN — ROCURONIUM BROMIDE 20 MG: 10 SOLUTION INTRAVENOUS at 12:07

## 2025-07-02 RX ADMIN — SODIUM CHLORIDE, SODIUM LACTATE, POTASSIUM CHLORIDE, AND CALCIUM CHLORIDE: .6; .31; .03; .02 INJECTION, SOLUTION INTRAVENOUS at 11:07

## 2025-07-02 RX ADMIN — FENTANYL CITRATE 25 MCG: 50 INJECTION INTRAMUSCULAR; INTRAVENOUS at 02:07

## 2025-07-02 RX ADMIN — ONDANSETRON 4 MG: 2 INJECTION INTRAMUSCULAR; INTRAVENOUS at 02:07

## 2025-07-02 RX ADMIN — GLYCOPYRROLATE 0.2 MG: 0.2 INJECTION, SOLUTION INTRAMUSCULAR; INTRAVITREAL at 12:07

## 2025-07-02 RX ADMIN — EPHEDRINE SULFATE 20 MG: 50 INJECTION INTRAVENOUS at 12:07

## 2025-07-02 RX ADMIN — DEXMEDETOMIDINE 6 MCG: 200 INJECTION, SOLUTION INTRAVENOUS at 12:07

## 2025-07-02 RX ADMIN — MIDAZOLAM HYDROCHLORIDE 2 MG: 1 INJECTION, SOLUTION INTRAMUSCULAR; INTRAVENOUS at 11:07

## 2025-07-02 RX ADMIN — DEXAMETHASONE SODIUM PHOSPHATE 8 MG: 4 INJECTION, SOLUTION INTRA-ARTICULAR; INTRALESIONAL; INTRAMUSCULAR; INTRAVENOUS; SOFT TISSUE at 11:07

## 2025-07-02 RX ADMIN — LIDOCAINE HYDROCHLORIDE 100 MG: 20 INJECTION, SOLUTION EPIDURAL; INFILTRATION; INTRACAUDAL; PERINEURAL at 11:07

## 2025-07-02 RX ADMIN — FENTANYL CITRATE 50 MCG: 0.05 INJECTION, SOLUTION INTRAMUSCULAR; INTRAVENOUS at 11:07

## 2025-07-02 RX ADMIN — DEXMEDETOMIDINE 4 MCG: 200 INJECTION, SOLUTION INTRAVENOUS at 01:07

## 2025-07-02 RX ADMIN — CHLORHEXIDINE GLUCONATE 0.12% ORAL RINSE 10 ML: 1.2 LIQUID ORAL at 10:07

## 2025-07-02 RX ADMIN — ONDANSETRON 8 MG: 2 INJECTION INTRAMUSCULAR; INTRAVENOUS at 01:07

## 2025-07-02 RX ADMIN — SODIUM CHLORIDE, SODIUM LACTATE, POTASSIUM CHLORIDE, AND CALCIUM CHLORIDE: .6; .31; .03; .02 INJECTION, SOLUTION INTRAVENOUS at 01:07

## 2025-07-02 RX ADMIN — EPHEDRINE SULFATE 10 MG: 50 INJECTION INTRAVENOUS at 11:07

## 2025-07-02 RX ADMIN — FAMOTIDINE 20 MG: 20 TABLET, FILM COATED ORAL at 10:07

## 2025-07-02 RX ADMIN — SUGAMMADEX 200 MG: 100 INJECTION, SOLUTION INTRAVENOUS at 01:07

## 2025-07-02 RX ADMIN — ROCURONIUM BROMIDE 50 MG: 10 SOLUTION INTRAVENOUS at 11:07

## 2025-07-02 NOTE — TRANSFER OF CARE
"Anesthesia Transfer of Care Note    Patient: Crissy Argueta    Procedure(s) Performed: Procedure(s) (LRB):  XI ROBOTIC HYSTERECTOMY,WITH SALPINGECTOMY (Bilateral)  APPLICATION, ACELLULAR HUMAN DERMAL ALLOGRAFT (N/A)    Patient location: PACU    Anesthesia Type: general    Transport from OR: Transported from OR on room air with adequate spontaneous ventilation    Post pain: adequate analgesia    Post assessment: no apparent anesthetic complications    Post vital signs: stable    Level of consciousness: awake and alert    Nausea/Vomiting: no nausea/vomiting    Complications: none    Transfer of care protocol was followed    Last vitals: Visit Vitals  /60   Pulse 77   Temp 36.8 °C (98.2 °F) (Temporal)   Resp 16   Ht 5' 7" (1.702 m)   Wt 52.8 kg (116 lb 6.5 oz)   LMP 06/15/2025 (Approximate)   SpO2 100%   Breastfeeding No   BMI 18.23 kg/m²     "

## 2025-07-02 NOTE — ANESTHESIA PROCEDURE NOTES
Intubation    Date/Time: 7/2/2025 11:47 AM    Performed by: Jaime Major CRNA  Authorized by: Halie New MD    Intubation:     Induction:  Intravenous    Intubated:  Postinduction    Mask Ventilation:  Easy mask    Attempts:  2    Attempted By:  Student    Method of Intubation:  Direct    Blade:  Ignacio 2 (first attempt with ignacio 2)    Laryngeal View Grade: Grade III - only epiglottis visible      Attempted By (2nd Attempt):  Student    Method of Intubation (2nd Attempt):  Video laryngoscopy    Blade (2nd Attempt):  Aleman 3    Laryngeal View Grade (2nd Attempt): Grade I - full view of cords      Difficult Airway Encountered?: No      Complications:  None    Airway Device:  Oral endotracheal tube    Airway Device Size:  7.5    Style/Cuff Inflation:  Cuffed (inflated to minimal occlusive pressure)    Tube secured:  21    Secured at:  The teeth    Placement Verified By:  Capnometry    Complicating Factors:  Anterior larynx    Findings Post-Intubation:  BS equal bilateral

## 2025-07-02 NOTE — DISCHARGE SUMMARY
[facility] ([facility])  Obstetrics & Gynecology  Discharge Summary    Patient Name: Crissy Argueta  MRN: [id number]  Admission Date: 7/2/2025  Hospital Length of Stay: 0 days  Discharge Date and Time: 07/02/2025 1:36 PM  Attending Physician: Sarah Garduno MD   Discharging Provider: Sarah Garduno MD  Primary Care Provider: Vera, Primary Doctor    HPI:  No notes on file    Hospital Course:  No notes on file    Goals of Care Treatment Preferences:  Code Status: Full Code      Procedure(s) (LRB):  XI ROBOTIC HYSTERECTOMY,WITH SALPINGECTOMY (Bilateral)  APPLICATION, ACELLULAR HUMAN DERMAL ALLOGRAFT (N/A)         Significant Diagnostic Studies: N/A      Pending Diagnostic Studies:       Procedure Component Value Units Date/Time    Specimen to Pathology Gynecology and Obstetrics [[id number]] Collected: 07/02/25 1325    Order Status: Sent Lab Status: No result     Specimen: Tissue from Uterus, Cervix, Bilateral Fallopian Tubes           Final Active Diagnoses:    Diagnosis Date Noted POA    Abnormal uterine bleeding (AUB) [N93.9] 11/11/2024 Yes    Blood loss anemia [D50.0] 10/14/2024 Yes      Problems Resolved During this Admission:        Discharged Condition: stable    Disposition:     Follow Up:   Follow-up Information       Sarah aGrduno MD Follow up in 6 week(s).    Specialty: Obstetrics and Gynecology  Why: post op visit  Contact information:  [address] [facility] [address] [phone]                           Patient Instructions:      Diet general     Call MD for:  temperature >100.4     Call MD for:  persistent nausea and vomiting     Call MD for:  severe uncontrolled pain     Call MD for:  difficulty breathing, headache or visual disturbances     Call MD for:  redness, tenderness, or signs of infection (pain, swelling, redness, odor or green/yellow discharge around incision site)     Call MD for:  hives     Call MD for:  persistent dizziness or light-headedness     Call MD for:  extreme fatigue      Call MD for:   Order Comments: Heavy vaginal bleeding (filling up 1 pad in 1 hour, two hours in a row)     Medications:  Reconciled Home Medications:      Medication List        START taking these medications      oxyCODONE-acetaminophen 5-325 mg per tablet  Commonly known as: PERCOCET  Take 1 tablet by mouth every 4 (four) hours as needed for Pain.            CHANGE how you take these medications      ibuprofen 800 MG tablet  Commonly known as: ADVIL,MOTRIN  Take 1 tablet (800 mg total) by mouth every 8 (eight) hours as needed for Pain.  What changed: when to take this            CONTINUE taking these medications      ferrous sulfate 325 mg (65 mg iron) Tab tablet  Commonly known as: FEOSOL  Take by mouth.     ondansetron 4 MG tablet  Commonly known as: ZOFRAN  Take 1 tablet (4 mg total) by mouth every 6 (six) hours as needed for Nausea.     promethazine-dextromethorphan 6.25-15 mg/5 mL Syrp  Commonly known as: PROMETHAZINE-DM  Take 5 mLs by mouth every 6 (six) hours as needed.            STOP taking these medications      HYDROcodone-acetaminophen 7.5-325 mg per tablet  Commonly known as: NORCO     medroxyPROGESTERone 10 MG tablet  Commonly known as: PROVERA            ASK your doctor about these medications      azelastine 137 mcg (0.1 %) nasal spray  Commonly known as: ASTELIN  SMARTSI Spray(s) Both Nares Every 12 Hours     JOLIE (WITH COLLAGEN) 7-7-1.5 gram Pwpk  Generic drug: [id number]-BtKGF-collag-mv-min  Take by mouth.     ondansetron 8 MG Tbdl  Commonly known as: ZOFRAN-ODT  Place under the tongue.              Sarah Garduno MD  Obstetrics & Gynecology  [facility] [facility] ([facility])

## 2025-07-02 NOTE — DISCHARGE INSTRUCTIONS
Laparoscopic Hysterectomy     Please read the instructions outlined below and refer to this sheet in the next few weeks. Occasionally unavoidable complications can occur, so if you have any problems or questions after discharge that this sheet does not address, please call your doctor's office.    If you feel feverish or have shaking chills, take your temperature.    Call your doctor if your temperature is 100.4 degrees or above. A fever may mean there is an infection.      Activity   Do not put anything in the vagina for 8 weeks (i.e. sexual intercourse, tampons, douching).   You may shower 48 hours after surgery. Protect incisions as outlined in incision care.   No tub baths or swimming pools until your follow-up appointment.    No heavy lifting (>10-15 pounds) or rigorous exercise (weight-lifting, sit-ups) for 4 weeks.   Do not drive while taking narcotic pain medication. Do not drive until your post-op soreness has resolved.    You may return to work as you feel ready to do so, or when your pain is minimal and you are able to perform your job.     Pain Control   We recommend alternating acetaminophen (Tylenol) and ibuprofen (Motrin) around the clock for the first few days:     For example, take 800 mg Motrin, then 4 hours later take 1000 mg Tylenol; 4 hours later you will be due for your next dose of Motrin.   For severe pain not relieved by this, you can take add on oxycodone (narcotic pain medication) as needed, but not more frequently than every 4-6 hours.    Dispose of any excess narcotic pain medication at local police station or pharmacy, or flush down toilet.   You may feel some bloating and temporary right shoulder discomfort following surgery. It is caused by absorption of air through the chest wall and will go away on its own.    Walking around helps the gas dissolve more quickly.  You can try over the counter gas-x for bloating as well.     Incisions   Check your incisions daily. Call your doctor if  you notice increased redness, discoloration, swelling, bleeding, or pus-like drainage, or persistent clear drainage.     Do not use hydrogen peroxide to clean your incisions.  Your incisions are closed with suture and skin glue.   If you have skin glue and no bandages, do not get the skin glue wet for 48 hours after surgery. The glue will fall off on its own over a few weeks.     Elimination   Your normal bowel function may take a few days to return. Narcotic pain medications can cause constipation. You may:   Take the mild laxative that was prescribed (Senna) and if no bowel movement by day 4-5, you can add Colace, Miralax, or Milk of Magnesia.   Add fruit and bran (high fiber foods) to your diet.   Drink more water.   Call your doctor if the constipation is not relieved or if you are unable to pass gas.       Nutrition   You may resume your normal diet as you tolerate, but may prefer to eat light meals for the first day after surgery. Drink plenty of water (6-8 glasses a day). Eat a well-balanced diet. Call your doctor for persistent nausea or vomiting.     Vaginal Discharge   You may have some light bleeding and vaginal discharge for several weeks. Over the next few months you may also have intermittent spotting. If it is heavier than a normal period, call your doctor. Let your doctor know when the bleeding began and the number of pads you have used.      Follow-up Appointment   You should see your doctor for a post-op appointment in approximately 4-6 weeks unless otherwise indicated.

## 2025-07-02 NOTE — INTERVAL H&P NOTE
The patient has been examined and the H&P has been reviewed:    I concur with the findings and no changes have occurred since H&P was written.    Surgery. risks, benefits and alternative options discussed and understood by patient/family.          Active Hospital Problems    Diagnosis  POA    Abnormal uterine bleeding (AUB) [N93.9]  Yes    Blood loss anemia [D50.0]  Yes      Resolved Hospital Problems   No resolved problems to display.

## 2025-07-02 NOTE — OP NOTE
Operative Note     Pre-Op Diagnosis:  1. Menorrhagia     Post-Op Diagnosis: Same      Procedures:   1. Robotic hysterectomy/BS  2. Adhesion barrier placement     Attending Surgeon: Sarah Garduno MD   First assist: MRAI Reddy - medically needed for procedure         Findings:   1. Normal upper abdomen  2. Grossly normal uterus , somewhat globular appearing  3. Grossly normal tubes  4/ Bilateral functional ovarian cysts, The right was inadvertently ruptured, the left ovary appears to have a 2cm hemorrhagic cyst     Anesthesia: General      IV Fluid: 1200 mL      EBL: 50 mL     Specimens: Uterus, cervix, tubes,      Drains: Weinstein to gravity with 100 ccs clear urine output      Complications: None      PPROCEDURE:  Informed consent was obtained.  The patient received DVT prophylaxis with SCD's as well as antibiotic prophylaxis.  The patient was taken to the  operating room where general anesthesia was induced.  The patient was prepped  and draped in normal sterile fashion in dorsal lithotomy position with her arms carefully tucked.  All IV sites and lines were confirmed patent after tucking the arms. A MARLA  manipulator was placed in the uterus without difficulty, and the weinstein was placed.     A  Veress needle was placed through the abdominal wall near umbilicus. An opening pressure of 5 was noted, and the abdomen was insufflated with CO2. A 8mm trocar was introduced. IP placement was confirmed and there was no evidence of injury. In total, two left, one right, and one supraumbilical robotic trocars were placed, all under direct visualization.       The patient was placed in Trendelenburg.   The  abdomen was then explored with findings noted above. The robot was docked in the usual fashion. The utero-ovarian ligament, salpinx, mesosalpinx, and round ligament were cauterized and transected followed by successive pedicles of the posterior broad ligament, round ligament and carried through the anterior broad  ligament to create a bladder flap. Same was performed on the other side and the uterine arteries were skeletonized on both sides. Anterior followed by posterior colpotomies were made.  Next, the left uterine artery was then cauterized and transected followed by successive pedicles of the cardinal ligament to reach the colpotomy site. The same was performed on the other side. The uterus and cervix were removed through the vagina and sent to pathology.    The vaginal cuff was closed with running 0 V Loc suture.  Adhesion barrier placed along the vaginal cuff. All pedicles were inspected and found to be hemostatic on low flow.        The robot undocked in  the usual fashion.  The umbilical port was closed with a with #4-0 Monocryl. Vaginal cuff was inspected at the completion of the case  and found to be closed and hemostatic.     The patient tolerated the procedure well.  Sponge, lap, and needle counts  were correct x2 and I was present, scrubbed, or at the console for the entire procedure.    Sarah Garudno MD  Obstetrics and Gynecology  [facility] [place], [place]

## 2025-07-02 NOTE — ANESTHESIA PREPROCEDURE EVALUATION
07/02/2025  Crissy Argueta is a 45 y.o., female.      Pre-op Assessment    I have reviewed the Patient Summary Reports.     I have reviewed the Nursing Notes. I have reviewed the NPO Status.      Review of Systems  Anesthesia Hx:  No problems with previous Anesthesia                Hematology/Oncology:  Hematology Normal   Oncology Normal                                   Renal/:  Renal/ Normal                 Hepatic/GI:  Hepatic/GI Normal                    Neurological:    Neuromuscular Disease,                                   Endocrine:  Endocrine Normal            Dermatological:  Skin Normal    Psych:  Psychiatric History                  Physical Exam  General: Well nourished, Cooperative, Alert and Oriented    Airway:  Mallampati: II / II  Mouth Opening: Normal  TM Distance: Normal  Tongue: Normal  Neck ROM: Normal ROM    Dental:  Intact      Patient Active Problem List   Diagnosis    Blood loss anemia    Abnormal uterine bleeding (AUB)         Anesthesia Plan  Type of Anesthesia, risks & benefits discussed:    Anesthesia Type: Gen ETT  Intra-op Monitoring Plan: Standard ASA Monitors  Post Op Pain Control Plan: multimodal analgesia  Induction:  IV  Airway Plan: Direct  Informed Consent: Informed consent signed with the Patient and all parties understand the risks and agree with anesthesia plan.  All questions answered.   ASA Score: 2  Day of Surgery Review of History & Physical: H&P Update referred to the surgeon/provider.I have interviewed and examined the patient. I have reviewed the patient's H&P dated: There are no significant changes. H&P completed by Anesthesiologist.  Anesthesia Plan Notes: Pt removed her lower denture. She also has upper denture, but it is glued in extremely tightly, and she rarely remove it at home even when sleeping. Unable to remove. I explained to her the rest of  leaving that denture in mouth including gum injury, aspiration hazard, and other complications that could cause severe airway compromise or even death. Per pt she understands and accepts the risk. All questions explained to satisfaction.  MANAS QUINN    Ready For Surgery From Anesthesia Perspective.     .

## 2025-07-07 ENCOUNTER — TELEPHONE (OUTPATIENT)
Dept: OBSTETRICS AND GYNECOLOGY | Facility: CLINIC | Age: 45
End: 2025-07-07
Payer: MEDICAID

## 2025-07-07 ENCOUNTER — HOSPITAL ENCOUNTER (EMERGENCY)
Facility: HOSPITAL | Age: 45
Discharge: HOME | End: 2025-07-07
Attending: FAMILY MEDICINE
Payer: MEDICAID

## 2025-07-07 VITALS
RESPIRATION RATE: 16 BRPM | BODY MASS INDEX: 18.32 KG/M2 | TEMPERATURE: 98 F | WEIGHT: 117 LBS | HEART RATE: 55 BPM | DIASTOLIC BLOOD PRESSURE: 74 MMHG | SYSTOLIC BLOOD PRESSURE: 135 MMHG | OXYGEN SATURATION: 100 %

## 2025-07-07 DIAGNOSIS — G89.18 POSTOPERATIVE PAIN: Primary | ICD-10-CM

## 2025-07-07 LAB
ABSOLUTE EOSINOPHIL (OHS): 0.19 K/UL
ABSOLUTE MONOCYTE (OHS): 0.51 K/UL (ref 0.3–1)
ABSOLUTE NEUTROPHIL COUNT (OHS): 5.37 K/UL (ref 1.8–7.7)
ALBUMIN SERPL BCP-MCNC: 3.6 G/DL (ref 3.5–5.2)
ALP SERPL-CCNC: 50 UNIT/L (ref 40–150)
ALT SERPL W/O P-5'-P-CCNC: 11 UNIT/L (ref 10–44)
ANION GAP (OHS): 9 MMOL/L (ref 8–16)
AST SERPL-CCNC: 13 UNIT/L (ref 11–45)
BASOPHILS # BLD AUTO: 0.03 K/UL
BASOPHILS NFR BLD AUTO: 0.4 %
BILIRUB SERPL-MCNC: 0.4 MG/DL (ref 0.1–1)
BILIRUB UR QL STRIP.AUTO: NEGATIVE
BUN SERPL-MCNC: 8 MG/DL (ref 6–20)
CALCIUM SERPL-MCNC: 8.9 MG/DL (ref 8.7–10.5)
CHLORIDE SERPL-SCNC: 105 MMOL/L (ref 95–110)
CLARITY UR: CLEAR
CO2 SERPL-SCNC: 26 MMOL/L (ref 23–29)
COLOR UR AUTO: YELLOW
CREAT SERPL-MCNC: 0.7 MG/DL (ref 0.5–1.4)
ERYTHROCYTE [DISTWIDTH] IN BLOOD BY AUTOMATED COUNT: 17.5 % (ref 11.5–14.5)
GFR SERPLBLD CREATININE-BSD FMLA CKD-EPI: >60 ML/MIN/1.73/M2
GLUCOSE SERPL-MCNC: 88 MG/DL (ref 70–110)
GLUCOSE UR QL STRIP: NEGATIVE
HCT VFR BLD AUTO: 32.7 % (ref 37–48.5)
HGB BLD-MCNC: 10.6 GM/DL (ref 12–16)
HGB UR QL STRIP: ABNORMAL
IMM GRANULOCYTES # BLD AUTO: 0.02 K/UL (ref 0–0.04)
IMM GRANULOCYTES NFR BLD AUTO: 0.3 % (ref 0–0.5)
KETONES UR QL STRIP: NEGATIVE
LEUKOCYTE ESTERASE UR QL STRIP: ABNORMAL
LIPASE SERPL-CCNC: 69 U/L (ref 4–60)
LYMPHOCYTES # BLD AUTO: 1.15 K/UL (ref 1–4.8)
MAGNESIUM SERPL-MCNC: 1.9 MG/DL (ref 1.6–2.6)
MCH RBC QN AUTO: 27.9 PG (ref 27–31)
MCHC RBC AUTO-ENTMCNC: 32.4 G/DL (ref 32–36)
MCV RBC AUTO: 86 FL (ref 82–98)
MICROSCOPIC COMMENT: ABNORMAL
NITRITE UR QL STRIP: NEGATIVE
NUCLEATED RBC (/100WBC) (OHS): 0 /100 WBC
PH UR STRIP: 6 [PH]
PLATELET # BLD AUTO: 329 K/UL (ref 150–450)
PMV BLD AUTO: 10.2 FL (ref 9.2–12.9)
POTASSIUM SERPL-SCNC: 3.8 MMOL/L (ref 3.5–5.1)
PROT SERPL-MCNC: 7.1 GM/DL (ref 6–8.4)
PROT UR QL STRIP: NEGATIVE
RBC # BLD AUTO: 3.8 M/UL (ref 4–5.4)
RBC #/AREA URNS AUTO: 17 /HPF (ref 0–4)
RELATIVE EOSINOPHIL (OHS): 2.6 %
RELATIVE LYMPHOCYTE (OHS): 15.8 % (ref 18–48)
RELATIVE MONOCYTE (OHS): 7 % (ref 4–15)
RELATIVE NEUTROPHIL (OHS): 73.9 % (ref 38–73)
SODIUM SERPL-SCNC: 140 MMOL/L (ref 136–145)
SP GR UR STRIP: 1.01
SQUAMOUS #/AREA URNS AUTO: 3 /HPF
UROBILINOGEN UR STRIP-ACNC: NEGATIVE EU/DL
WBC # BLD AUTO: 7.27 K/UL (ref 3.9–12.7)
WBC #/AREA URNS AUTO: 14 /HPF (ref 0–5)
WBC CLUMPS UR QL AUTO: ABNORMAL

## 2025-07-07 PROCEDURE — 83735 ASSAY OF MAGNESIUM: CPT | Performed by: FAMILY MEDICINE

## 2025-07-07 PROCEDURE — 85025 COMPLETE CBC W/AUTO DIFF WBC: CPT | Performed by: NURSE PRACTITIONER

## 2025-07-07 PROCEDURE — 83690 ASSAY OF LIPASE: CPT | Performed by: NURSE PRACTITIONER

## 2025-07-07 PROCEDURE — 81003 URINALYSIS AUTO W/O SCOPE: CPT | Performed by: NURSE PRACTITIONER

## 2025-07-07 PROCEDURE — 25500020 PHARM REV CODE 255: Performed by: FAMILY MEDICINE

## 2025-07-07 PROCEDURE — 87086 URINE CULTURE/COLONY COUNT: CPT | Performed by: NURSE PRACTITIONER

## 2025-07-07 PROCEDURE — 96374 THER/PROPH/DIAG INJ IV PUSH: CPT

## 2025-07-07 PROCEDURE — 99285 EMERGENCY DEPT VISIT HI MDM: CPT | Mod: 25

## 2025-07-07 PROCEDURE — 63600175 PHARM REV CODE 636 W HCPCS: Performed by: FAMILY MEDICINE

## 2025-07-07 PROCEDURE — 80053 COMPREHEN METABOLIC PANEL: CPT | Performed by: NURSE PRACTITIONER

## 2025-07-07 PROCEDURE — 96375 TX/PRO/DX INJ NEW DRUG ADDON: CPT

## 2025-07-07 RX ORDER — ONDANSETRON 4 MG/1
4 TABLET, FILM COATED ORAL EVERY 6 HOURS
Qty: 12 TABLET | Refills: 0 | Status: SHIPPED | OUTPATIENT
Start: 2025-07-07

## 2025-07-07 RX ORDER — HYDROMORPHONE HYDROCHLORIDE 1 MG/ML
0.5 INJECTION, SOLUTION INTRAMUSCULAR; INTRAVENOUS; SUBCUTANEOUS
Refills: 0 | Status: COMPLETED | OUTPATIENT
Start: 2025-07-07 | End: 2025-07-07

## 2025-07-07 RX ORDER — LORAZEPAM 2 MG/ML
0.25 INJECTION INTRAMUSCULAR
Status: COMPLETED | OUTPATIENT
Start: 2025-07-07 | End: 2025-07-07

## 2025-07-07 RX ORDER — ACETAMINOPHEN 500 MG
500 TABLET ORAL EVERY 6 HOURS PRN
COMMUNITY

## 2025-07-07 RX ORDER — OXYCODONE AND ACETAMINOPHEN 10; 325 MG/1; MG/1
1 TABLET ORAL EVERY 6 HOURS PRN
Qty: 12 TABLET | Refills: 0 | Status: SHIPPED | OUTPATIENT
Start: 2025-07-07

## 2025-07-07 RX ORDER — ONDANSETRON HYDROCHLORIDE 2 MG/ML
4 INJECTION, SOLUTION INTRAVENOUS
Status: COMPLETED | OUTPATIENT
Start: 2025-07-07 | End: 2025-07-07

## 2025-07-07 RX ORDER — CEFTRIAXONE 1 G/1
1 INJECTION, POWDER, FOR SOLUTION INTRAMUSCULAR; INTRAVENOUS
Status: COMPLETED | OUTPATIENT
Start: 2025-07-07 | End: 2025-07-07

## 2025-07-07 RX ADMIN — LORAZEPAM 0.25 MG: 2 INJECTION INTRAMUSCULAR; INTRAVENOUS at 06:07

## 2025-07-07 RX ADMIN — ONDANSETRON 4 MG: 2 INJECTION INTRAMUSCULAR; INTRAVENOUS at 06:07

## 2025-07-07 RX ADMIN — CEFTRIAXONE 1 G: 1 INJECTION, POWDER, FOR SOLUTION INTRAMUSCULAR; INTRAVENOUS at 05:07

## 2025-07-07 RX ADMIN — IOHEXOL 100 ML: 350 INJECTION, SOLUTION INTRAVENOUS at 05:07

## 2025-07-07 RX ADMIN — HYDROMORPHONE HYDROCHLORIDE 0.5 MG: 1 INJECTION, SOLUTION INTRAMUSCULAR; INTRAVENOUS; SUBCUTANEOUS at 06:07

## 2025-07-07 NOTE — TELEPHONE ENCOUNTER
"Called patient and she is complaining of post-op pain 8/10 and pain with breathing (especially on her right side).    Patient stated she was discharged home on date of surgery.  Patient stated she told the nurse (him) that she was having trouble breathing and this has continued since surgery (especially when trying to lie on her right side).  Patient stated "they rushed me out of there and didn't even take my IV out, I did it myself when I got home".    Patient requesting refill of pain medication.  Patient state she has tried alternating Tylenol and Ibuprofen and that is not helping.     Patient advised to go to ED for eval for post-op pain.  Appointment scheduled with PA to evaluate pain tomorrow if patient cannot or does not go to ED this evening.  Advised patient message will be sent to Dr. Garduno.  " Myself

## 2025-07-07 NOTE — ED PROVIDER NOTES
SCRIBE #1 NOTE: I, Meaghan Esposito, am scribing for, and in the presence of, Nico Short MD. I have scribed the entire note.       History     Chief Complaint   Patient presents with    Abdominal Pain     Right upper abdominal pain s/p hysterectomy on 07/02/2025     Review of patient's allergies indicates:   Allergen Reactions    Morphine Swelling and Hives    Tramadol Other (See Comments)     headache         History of Present Illness     HPI    7/7/2025, 5:32 PM  History obtained from the patient and medical records      History of Present Illness: Crissy Argueta is a 45 y.o. female patient with a PMHx of sciatica, anxiety, and rheumatoid arthritis who presents to the Emergency Department for evaluation of abdominal pain which began 5 days PTA. Pt underwent a laparoscopic hysterectomy on 7/2/25. Pt is now experiencing severe right sided abdominal pain that is radiating to her right shoulder. Pt is also experiencing SOB when she lays on her right side due to pain. Symptoms are constant and moderate in severity. No mitigating or exacerbating factors reported. Patient denies any fever or chills. No prior Tx specified.  Pt reports an allergy to morphine. No further complaints or concerns at this time.       Arrival mode: Personal Transportation    PCP: Vera, Primary Doctor        Past Medical History:  Past Medical History:   Diagnosis Date    Anxiety disorder, unspecified     Back injury     Rheumatoid arthritis, unspecified     Sciatica        Past Surgical History:  Past Surgical History:   Procedure Laterality Date    CYST REMOVAL N/A 10/15/2024    Procedure: EXCISION, CYST;  Surgeon: Alexandra Bird MD;  Location: [facility] OR;  Service: OB/GYN;  Laterality: N/A;    HYSTERECTOMY      HYSTEROSCOPY WITH DILATION AND CURETTAGE OF UTERUS N/A 10/15/2024    Procedure: HYSTEROSCOPY, WITH DILATION AND CURETTAGE OF UTERUS;  Surgeon: Alexandra Bird MD;  Location: [facility] OR;  Service: OB/GYN;   Laterality: N/A;    PLACEMENT OF ACELLULAR HUMAN DERMAL ALLOGRAFT N/A 7/2/2025    Procedure: APPLICATION, ACELLULAR HUMAN DERMAL ALLOGRAFT;  Surgeon: Sarah Garduno MD;  Location: [facility] OR;  Service: OB/GYN;  Laterality: N/A;  vaginal cuff    XI ROBOTIC HYSTERECTOMY, WITH SALPINGECTOMY Bilateral 7/2/2025    Procedure: XI ROBOTIC HYSTERECTOMY,WITH SALPINGECTOMY;  Surgeon: Sarah Garduno MD;  Location: [facility] OR;  Service: OB/GYN;  Laterality: Bilateral;         Family History:  No family history on file.    Social History:  Social History     Tobacco Use    Smoking status: Never    Smokeless tobacco: Never   Substance and Sexual Activity    Alcohol use: Never    Drug use: Never    Sexual activity: Not Currently        Review of Systems     Review of Systems   Constitutional:  Negative for chills and fever.   HENT:  Negative for sore throat.    Respiratory:  Positive for shortness of breath (when laying on right side).    Cardiovascular:  Negative for chest pain.   Gastrointestinal:  Positive for abdominal pain (right sided). Negative for nausea.   Genitourinary:  Negative for dysuria.   Musculoskeletal:  Positive for arthralgias (right shoulder). Negative for back pain.   Skin:  Negative for rash.   Neurological:  Negative for weakness.   Hematological:  Does not bruise/bleed easily.   All other systems reviewed and are negative.       Physical Exam     Initial Vitals [07/07/25 1539]   BP Pulse Resp Temp SpO2   (!) 137/59 93 20 98.4 °F (36.9 °C) 99 %      MAP       --          Physical Exam  Nursing Notes and Vital Signs Reviewed.  Constitutional: Patient is in no acute distress. Well-developed and well-nourished. Anxious.  Head: Atraumatic. Normocephalic.  Eyes: PERRL. EOM intact. Conjunctivae are not pale. No scleral icterus.  ENT: Mucous membranes are moist. Oropharynx is clear and symmetric.    Neck: Supple. Full ROM. No lymphadenopathy.  Cardiovascular: Bradycardic. Regular rhythm. No murmurs, rubs, or gallops.  Distal pulses are 2+ and symmetric.  Pulmonary/Chest: Hyperventilating. Clear to auscultation bilaterally. No wheezing or rales.  Abdominal: Soft and non-distended. Mild diffuse tenderness.  No rebound, guarding, or rigidity. Good bowel sounds. Non-acute abdomen. 4 healing puncture wounds.   Genitourinary: No CVA tenderness.  Musculoskeletal: Moves all extremities. No obvious deformities. No edema. No calf tenderness.  Skin: Warm and dry.  Neurological:  Alert, awake, and appropriate.  Normal speech.  No acute focal neurological deficits are appreciated.  Psychiatric: Normal affect. Good eye contact. Appropriate in content.     ED Course   Procedures  ED Vital Signs:  Vitals:    07/07/25 1539 07/07/25 1748 07/07/25 1801 07/07/25 1803   BP: (!) 137/59 (!) 141/85  (!) 142/86   Pulse: 93 (!) 56  63   Resp: 20  16    Temp: 98.4 °F (36.9 °C)      SpO2: 99%   100%   Weight: 53.1 kg (117 lb)       07/07/25 1849 07/07/25 1900   BP: (!) 148/80 135/74   Pulse: 62 (!) 55   Resp:     Temp:     SpO2: (!) 94% 100%   Weight:         Abnormal Lab Results:  Labs Reviewed   LIPASE - Abnormal       Result Value    Lipase Level 69 (*)    URINALYSIS, REFLEX TO URINE CULTURE - Abnormal    Color, UA Yellow      Appearance, UA Clear      pH, UA 6.0      Spec Grav UA 1.010      Protein, UA Negative      Glucose, UA Negative      Ketones, UA Negative      Bilirubin, UA Negative      Blood, UA 1+ (*)     Nitrites, UA Negative      Urobilinogen, UA Negative      Leukocyte Esterase, UA 2+ (*)    CBC WITH DIFFERENTIAL - Abnormal    WBC 7.27      RBC 3.80 (*)     HGB 10.6 (*)     HCT 32.7 (*)     MCV 86      MCH 27.9      MCHC 32.4      RDW 17.5 (*)     Platelet Count 329      MPV 10.2      Nucleated RBC 0      Neut % 73.9 (*)     Lymph % 15.8 (*)     Mono % 7.0      Eos % 2.6      Basophil % 0.4      Imm Grans % 0.3      Neut # 5.37      Lymph # 1.15      Mono # 0.51      Eos # 0.19      Baso # 0.03      Imm Grans # 0.02     URINALYSIS  MICROSCOPIC - Abnormal    RBC, UA 17 (*)     WBC, UA 14 (*)     WBC Clumps, UA Occasional (*)     Squamous Epithelial Cells, UA 3      Microscopic Comment       COMPREHENSIVE METABOLIC PANEL - Normal    Sodium 140      Potassium 3.8      Chloride 105      CO2 26      Glucose 88      BUN 8      Creatinine 0.7      Calcium 8.9      Protein Total 7.1      Albumin 3.6      Bilirubin Total 0.4      ALP 50      AST 13      ALT 11      Anion Gap 9      eGFR >60     MAGNESIUM - Normal    Magnesium  1.9     CULTURE, URINE    Urine Culture No Significant Growth     CBC W/ AUTO DIFFERENTIAL    Narrative:     The following orders were created for panel order CBC auto differential.  Procedure                               Abnormality         Status                     ---------                               -----------         ------                     CBC with Differential[[id number]]       Abnormal            Final result                 Please view results for these tests on the individual orders.   GREY TOP URINE HOLD    Extra Tube Hold for add-ons.          All Lab Results:  Results for orders placed or performed during the hospital encounter of 07/07/25   Comprehensive metabolic panel    Collection Time: 07/07/25  4:02 PM   Result Value Ref Range    Sodium 140 136 - 145 mmol/L    Potassium 3.8 3.5 - 5.1 mmol/L    Chloride 105 95 - 110 mmol/L    CO2 26 23 - 29 mmol/L    Glucose 88 70 - 110 mg/dL    BUN 8 6 - 20 mg/dL    Creatinine 0.7 0.5 - 1.4 mg/dL    Calcium 8.9 8.7 - 10.5 mg/dL    Protein Total 7.1 6.0 - 8.4 gm/dL    Albumin 3.6 3.5 - 5.2 g/dL    Bilirubin Total 0.4 0.1 - 1.0 mg/dL    ALP 50 40 - 150 unit/L    AST 13 11 - 45 unit/L    ALT 11 10 - 44 unit/L    Anion Gap 9 8 - 16 mmol/L    eGFR >60 >60 mL/min/1.73/m2   Lipase    Collection Time: 07/07/25  4:02 PM   Result Value Ref Range    Lipase Level 69 (H) 4 - 60 U/L   CBC with Differential    Collection Time: 07/07/25  4:02 PM   Result Value Ref Range    WBC 7.27  3.90 - 12.70 K/uL    RBC 3.80 (L) 4.00 - 5.40 M/uL    HGB 10.6 (L) 12.0 - 16.0 gm/dL    HCT 32.7 (L) 37.0 - 48.5 %    MCV 86 82 - 98 fL    MCH 27.9 27.0 - 31.0 pg    MCHC 32.4 32.0 - 36.0 g/dL    RDW 17.5 (H) 11.5 - 14.5 %    Platelet Count 329 150 - 450 K/uL    MPV 10.2 9.2 - 12.9 fL    Nucleated RBC 0 <=0 /100 WBC    Neut % 73.9 (H) 38 - 73 %    Lymph % 15.8 (L) 18 - 48 %    Mono % 7.0 4 - 15 %    Eos % 2.6 <=8 %    Basophil % 0.4 <=1.9 %    Imm Grans % 0.3 0.0 - 0.5 %    Neut # 5.37 1.8 - 7.7 K/uL    Lymph # 1.15 1 - 4.8 K/uL    Mono # 0.51 0.3 - 1 K/uL    Eos # 0.19 <=0.5 K/uL    Baso # 0.03 <=0.2 K/uL    Imm Grans # 0.02 0.00 - 0.04 K/uL   Magnesium    Collection Time: 07/07/25  4:02 PM   Result Value Ref Range    Magnesium  1.9 1.6 - 2.6 mg/dL   Urine culture    Collection Time: 07/07/25  4:21 PM    Specimen: Urine   Result Value Ref Range    Urine Culture No Significant Growth    Urinalysis, Reflex to Urine Culture Urine, Clean Catch    Collection Time: 07/07/25  4:21 PM    Specimen: Urine   Result Value Ref Range    Color, UA Yellow Straw, Rosanna, Yellow, Light-Orange    Appearance, UA Clear Clear    pH, UA 6.0 5.0 - 8.0    Spec Grav UA 1.010 1.005 - 1.030    Protein, UA Negative Negative    Glucose, UA Negative Negative    Ketones, UA Negative Negative    Bilirubin, UA Negative Negative    Blood, UA 1+ (A) Negative    Nitrites, UA Negative Negative    Urobilinogen, UA Negative <2.0 EU/dL    Leukocyte Esterase, UA 2+ (A) Negative   GREY TOP URINE HOLD    Collection Time: 07/07/25  4:21 PM   Result Value Ref Range    Extra Tube Hold for add-ons.    Urinalysis Microscopic    Collection Time: 07/07/25  4:21 PM   Result Value Ref Range    RBC, UA 17 (H) 0 - 4 /HPF    WBC, UA 14 (H) 0 - 5 /HPF    WBC Clumps, UA Occasional (A) None, Rare    Squamous Epithelial Cells, UA 3 <=5 /HPF    Microscopic Comment         Imaging Results:  Imaging Results              CT Abdomen Pelvis With IV Contrast NO Oral Contrast (Final  result)  Result time 07/07/25 17:50:05      Final result by Teetee Anguiano MD (07/07/25 17:50:05)                   Impression:       Small volume ascites    All CT scans at [this location] are performed using dose modulation techniques as appropriate to a performed exam including the following: automated exposure control; adjustment of the mA and/or kV according to patient size (this includes techniques or standardized protocols for targeted exams where dose is matched to indication / reason for exam; i.e. extremities or head); use of iterative reconstruction technique.    Finalized on: 7/7/2025 5:50 PM By:  Teetee Anguiano MD  [facility]# [id number]      2025-07-07 17:52:07.296     [facility]               Narrative:    EXAM: CT ABDOMEN PELVIS WITH IV CONTRAST    CLINICAL INDICATION:  Abdominal abscess infection    TECHNIQUE: Axial and multiplanar 2-D reformations provided  CT abdomen and pelvis with intravenous contrast enhancement    FINDINGS:  Bibasilar dependent atelectasis or airspace opacity    Liver and spleen unremarkable  Pancreas unremarkable  Gallbladder present  Kidneys unremarkable without hydronephrosis  Urinary bladder decompressed    No obstructive bowel findings.  Small volume ascites.  Appendix not visualized but no overt pericecal fluid or change seen.                                                      The Emergency Provider reviewed the vital signs and test results, which are outlined above.     ED Discussion         7:09 PM: Reassessed pt at this time. Discussed with patient and/or family/caretaker all pertinent ED information and results. Discussed pt dx and plan of tx. Gave the patient all f/u and return to the ED instructions. All questions and concerns were addressed at this time. Patient and/or family/caretaker expresses understanding of information and instructions, and is comfortable with plan to discharge. Pt is stable for discharge.     I discussed with patient and/or family/caretaker that  evaluation in the ED does not suggest any emergent or life threatening medical conditions requiring immediate intervention beyond what was provided in the ED, and I believe patient is safe for discharge. Regardless, an unremarkable evaluation in the ED does not preclude the development or presence of a serious or life threatening condition. As such, I instructed that the patient is to return immediately for any worsening or change in current symptoms.         Medical Decision Making  45-year-old white female with a postop day 5 from a robotic assisted hysterectomy.  Patient presented today with diffuse abdominal pain.  Abdomen is benign.  Nonacute.  She did have some tenderness over the right upper quadrant and liver.  She states her pain has been referred to her right shoulder.  Patient was complaining of pain, emotional and crying.    I workup revealed a minor UTI.  White count was normal.  CT scan of the abdomen and pelvis was obtained which reveals a small amount of ascites which I believe may be fluid left over from surgery or from a leaking ovarian cyst.    I would appreciate input from OBGYN.    OBGYN we will follow up in a.m..  We will escalate her pain medications.  I suspect that the Ativan helped her anxiety component to pain.    We will escalate pain medication to Percocet 10 as needed for severe pain.  Follow up with OBGYN tomorrow as scheduled.    Amount and/or Complexity of Data Reviewed  Labs: ordered. Decision-making details documented in ED Course.  Radiology: ordered. Decision-making details documented in ED Course.    Risk  Prescription drug management.                ED Medication(s):  Medications   iohexoL (OMNIPAQUE 350) injection 100 mL (100 mLs Intravenous Given 7/7/25 1722)   cefTRIAXone injection 1 g (1 g Intravenous Given 7/7/25 1737)   HYDROmorphone injection 0.5 mg (0.5 mg Intravenous Given 7/7/25 1801)   ondansetron injection 4 mg (4 mg Intravenous Given 7/7/25 1800)   LORazepam  injection 0.25 mg (0.25 mg Intravenous Given 7/7/25 1802)       Discharge Medication List as of 7/7/2025  7:11 PM        START taking these medications    Details   oxyCODONE-acetaminophen (PERCOCET)  mg per tablet Take 1 tablet by mouth every 6 (six) hours as needed for Pain., Starting Mon 7/7/2025, Print              Follow-up Information       Sarah Garduno MD In 1 day.    Specialty: Obstetrics and Gynecology  Contact information:  [address] [facility].  [facility] [id number] [phone]                                 Scribe Attestation:   Scribe #1: I performed the above scribed service and the documentation accurately describes the services I performed. I attest to the accuracy of the note.     Attending:   Physician Attestation Statement for Scribe #1: I, Nico Short MD, personally performed the services described in this documentation, as scribed by Meaghan Esposito, in my presence, and it is both accurate and complete.           Clinical Impression       ICD-10-CM ICD-9-CM   1. Postoperative pain  G89.18 338.18       Disposition:   Disposition: Discharged  Condition: Stable         Nico Short MD  07/10/25 1549

## 2025-07-07 NOTE — FIRST PROVIDER EVALUATION
Medical screening examination initiated.  I have conducted a focused provider triage encounter, findings are as follows:    Brief history of present illness:  recent hysterectomy. Reports abdominal pain    Vitals:    07/07/25 1539   BP: (!) 137/59   Pulse: 93   Resp: 20   Temp: 98.4 °F (36.9 °C)   SpO2: 99%   Weight: 53.1 kg (117 lb)       Pertinent physical exam:  nad    Brief workup plan:  labs, meds, imaging, further eval     Preliminary workup initiated; this workup will be continued and followed by the physician or advanced practice provider that is assigned to the patient when roomed.

## 2025-07-07 NOTE — TELEPHONE ENCOUNTER
Discussed patient with LPN. Agree that patient should present for evaluation for SOB and pain post op.

## 2025-07-08 ENCOUNTER — OFFICE VISIT (OUTPATIENT)
Dept: OBSTETRICS AND GYNECOLOGY | Facility: CLINIC | Age: 45
End: 2025-07-08
Payer: MEDICAID

## 2025-07-08 VITALS
BODY MASS INDEX: 18.65 KG/M2 | DIASTOLIC BLOOD PRESSURE: 68 MMHG | SYSTOLIC BLOOD PRESSURE: 110 MMHG | WEIGHT: 118.81 LBS | HEIGHT: 67 IN

## 2025-07-08 DIAGNOSIS — Z90.710 S/P LAPAROSCOPIC HYSTERECTOMY: Primary | ICD-10-CM

## 2025-07-08 LAB — HOLD SPECIMEN: NORMAL

## 2025-07-08 PROCEDURE — 3074F SYST BP LT 130 MM HG: CPT | Mod: CPTII,,, | Performed by: PHYSICIAN ASSISTANT

## 2025-07-08 PROCEDURE — 3078F DIAST BP <80 MM HG: CPT | Mod: CPTII,,, | Performed by: PHYSICIAN ASSISTANT

## 2025-07-08 PROCEDURE — 99213 OFFICE O/P EST LOW 20 MIN: CPT | Mod: PBBFAC | Performed by: PHYSICIAN ASSISTANT

## 2025-07-08 PROCEDURE — 99024 POSTOP FOLLOW-UP VISIT: CPT | Mod: ,,, | Performed by: PHYSICIAN ASSISTANT

## 2025-07-08 PROCEDURE — 1159F MED LIST DOCD IN RCRD: CPT | Mod: CPTII,,, | Performed by: PHYSICIAN ASSISTANT

## 2025-07-08 PROCEDURE — 99999 PR PBB SHADOW E&M-EST. PATIENT-LVL III: CPT | Mod: PBBFAC,,, | Performed by: PHYSICIAN ASSISTANT

## 2025-07-08 RX ORDER — LORAZEPAM 1 MG/1
2 TABLET ORAL EVERY 8 HOURS PRN
Qty: 60 TABLET | Refills: 0 | Status: SHIPPED | OUTPATIENT
Start: 2025-07-08 | End: 2026-07-08

## 2025-07-08 NOTE — PROGRESS NOTES
OBGYN Post-op Clinic  History and Physical    Patient Name: Crissy Argueta  YOB: 1980 (45 y.o.)  MRN: [id number]  Today's Date: 2025    Referring Md:   No referring provider defined for this encounter.    SUBJECTIVE:     Chief Complaint: Post-op RALH    History of Present Illness:  Crissy Argueta is a 45 y.o. female  who presents to the clinic today for post-op evaluation. S/p RALH on 25. Presented to the ED on  with RUQ pain. Small amount of ascites on CT. Pain meds adjusted and patient given Ativan. Here today for follow-up. Reports that she is still in some pain but the medication she received in the ER helped, especially the Ativan. She states that she was in pain when she was initially discharged after surgery, and she was even sent home with her IV still in her arm (SOS submitted). States that since then her right side has been particularly painful, and the pain refers to her right shoulder. States that the ER told her she had an abdominal abscess. CT demonstrates small volume ascites but no pelvic fluid collection and no evidence of an abscess. Overall healing fairly well and following post-op restrictions and limitations .      Review of patient's allergies indicates:   Allergen Reactions    Morphine Swelling and Hives    Tramadol Other (See Comments)     headache       Past Medical History:   Diagnosis Date    Anxiety disorder, unspecified     Back injury     Rheumatoid arthritis, unspecified     Sciatica      Past Surgical History:   Procedure Laterality Date    CYST REMOVAL N/A 10/15/2024    Procedure: EXCISION, CYST;  Surgeon: Alexandra Bird MD;  Location: [facility] OR;  Service: OB/GYN;  Laterality: N/A;    HYSTERECTOMY      HYSTEROSCOPY WITH DILATION AND CURETTAGE OF UTERUS N/A 10/15/2024    Procedure: HYSTEROSCOPY, WITH DILATION AND CURETTAGE OF UTERUS;  Surgeon: Alexandra Bird MD;  Location: [facility] OR;  Service: OB/GYN;  Laterality: N/A;    PLACEMENT OF  "ACELLULAR HUMAN DERMAL ALLOGRAFT N/A 7/2/2025    Procedure: APPLICATION, ACELLULAR HUMAN DERMAL ALLOGRAFT;  Surgeon: Sarah Garduno MD;  Location: [facility] OR;  Service: OB/GYN;  Laterality: N/A;  vaginal cuff    XI ROBOTIC HYSTERECTOMY, WITH SALPINGECTOMY Bilateral 7/2/2025    Procedure: XI ROBOTIC HYSTERECTOMY,WITH SALPINGECTOMY;  Surgeon: Sarah Garduno MD;  Location: [facility] OR;  Service: OB/GYN;  Laterality: Bilateral;     No family history on file.  Social History[1]     Review of Systems:  Review of Systems   Constitutional:  Negative for chills and fever.   HENT:  Negative for congestion and sore throat.    Respiratory:  Negative for cough and shortness of breath.    Cardiovascular:  Negative for chest pain and leg swelling.   Gastrointestinal:  Negative for constipation, diarrhea, nausea and vomiting.   Genitourinary:  Negative for dysuria, frequency and urgency.   Musculoskeletal:  Negative for myalgias.   Skin:  Negative for rash.   Neurological:  Negative for weakness.       OBJECTIVE:     Vital Signs (Most Recent)  /68 (Patient Position: Sitting)   Ht 5' 7" (1.702 m)   Wt 53.9 kg (118 lb 13.3 oz)   LMP 06/15/2025 (Approximate)   BMI 18.61 kg/m²     Physical Exam  Vitals reviewed.   Constitutional:       General: She is not in acute distress.     Appearance: Normal appearance. She is not ill-appearing or toxic-appearing.   HENT:      Head: Normocephalic and atraumatic.   Eyes:      Conjunctiva/sclera: Conjunctivae normal.   Pulmonary:      Effort: Pulmonary effort is normal. No respiratory distress.   Abdominal:      General: Abdomen is flat. There is no distension.      Palpations: Abdomen is soft.      Tenderness: There is abdominal tenderness (mild, appropriate).      Comments: Four healing trocar sites present on abdomen. Dermabond glue in place. No erythema, induration, drainage, or swelling appreciated.    Musculoskeletal:         General: Normal range of motion.      Cervical back: Normal range " of motion.   Skin:     General: Skin is warm and dry.   Neurological:      General: No focal deficit present.      Mental Status: She is alert and oriented to person, place, and time.   Psychiatric:         Mood and Affect: Mood normal.         Behavior: Behavior normal.         Thought Content: Thought content normal.         Judgment: Judgment normal.             ASSESSMENT/PLAN:     Crissy Argueta is a 45 y.o. female was seen today for post-op pain. Physical exam benign. Reviewed CT findings with patient and her father including the images themselves. Reassured patient that she does not have an abscess and is healing well. Will send prescription for Ativan since this seemed to help her most in the ER.   I insured the patient has a scheduled post-op visit. I reviewed all restrictions and limitations with the patient including lifting/activity restrictions, pelvic rest, and showering daily (no tub baths/soaking in water of any kind) with antibacterial soap. Instructed patient to call clinic with any concerning issues or symptoms. Patient verbalized understanding.     Crissy was seen today for post-op evaluation.    Diagnoses and all orders for this visit:    S/P laparoscopic hysterectomy  -     LORazepam (ATIVAN) 1 MG tablet; Take 2 tablets (2 mg total) by mouth every 8 (eight) hours as needed for Anxiety.        - Showers only and no heavy lifting/strenuous activity for 8 weeks        - Pelvic rest (no tampon use, douching, or intercourse) for 12 weeks        - Ibuprofen and Percocet PRN for pain        - Keep f/u appointment with surgeon in a few weeks          Marisol Poe, [facility], PATRICA  OBGYN - Surgery  [facility]                 [1]   Social History  Tobacco Use    Smoking status: Never    Smokeless tobacco: Never   Substance Use Topics    Alcohol use: Never    Drug use: Never

## 2025-07-08 NOTE — ANESTHESIA POSTPROCEDURE EVALUATION
Anesthesia Post Evaluation    Patient: Crissy Argueta    Procedure(s) Performed: Procedure(s) (LRB):  XI ROBOTIC HYSTERECTOMY,WITH SALPINGECTOMY (Bilateral)  APPLICATION, ACELLULAR HUMAN DERMAL ALLOGRAFT (N/A)    Final Anesthesia Type: general      Patient location during evaluation: PACU  Patient participation: Yes- Able to Participate  Level of consciousness: awake and alert, oriented and awake  Post-procedure vital signs: reviewed and stable  Pain management: adequate  Airway patency: patent    PONV status at discharge: No PONV  Anesthetic complications: no      Cardiovascular status: blood pressure returned to baseline  Respiratory status: unassisted  Hydration status: euvolemic  Follow-up not needed.              Vitals Value Taken Time   /71 07/02/25 15:15   Temp 36.6 °C (97.9 °F) 07/02/25 15:15   Pulse 62 07/02/25 15:15   Resp 19 07/02/25 15:15   SpO2 99 % 07/02/25 15:15         Event Time   Out of Recovery 14:35:08         Pain/Myra Score: Pain Rating Prior to Med Admin: 10 (7/7/2025  6:01 PM)  Pain Rating Post Med Admin: 4 (7/7/2025  6:59 PM)

## 2025-07-08 NOTE — LETTER
July 8, 2025      [facility]  [address] [place] [address] [address] [address]  Phone: [phone]  Fax: [phone]       Patient: Crissy Argueta   YOB: 1980  Date of Visit: 07/08/2025    To Whom It May Concern:    Crissy Argueta  was at [facility] starting on 7/2/25. The patient may not work due to physical restrictions for 8 weeks. If you have any questions or concerns, or if I can be of further assistance, please do not hesitate to contact me.    Sincerely,      Marisol Poe [facility], PA-C  OBGYN - Surgery  [facility]

## 2025-07-09 LAB — BACTERIA UR CULT: NORMAL

## 2025-07-10 LAB
ESTROGEN SERPL-MCNC: NORMAL PG/ML
INSULIN SERPL-ACNC: NORMAL U[IU]/ML
LAB AP CLINICAL INFORMATION: NORMAL
LAB AP GROSS DESCRIPTION: NORMAL
LAB AP PERFORMING LOCATION(S): NORMAL
LAB AP REPORT FOOTNOTES: NORMAL

## 2025-08-13 ENCOUNTER — OFFICE VISIT (OUTPATIENT)
Dept: OBSTETRICS AND GYNECOLOGY | Facility: CLINIC | Age: 45
End: 2025-08-13
Payer: MEDICAID

## 2025-08-13 VITALS
WEIGHT: 115.5 LBS | BODY MASS INDEX: 18.13 KG/M2 | SYSTOLIC BLOOD PRESSURE: 122 MMHG | DIASTOLIC BLOOD PRESSURE: 84 MMHG | HEIGHT: 67 IN

## 2025-08-13 DIAGNOSIS — Z48.89 POSTOPERATIVE VISIT: Primary | ICD-10-CM

## 2025-08-13 DIAGNOSIS — F41.9 ANXIETY: ICD-10-CM

## 2025-08-13 PROCEDURE — 99213 OFFICE O/P EST LOW 20 MIN: CPT | Mod: PBBFAC | Performed by: STUDENT IN AN ORGANIZED HEALTH CARE EDUCATION/TRAINING PROGRAM

## 2025-08-13 PROCEDURE — 99999 PR PBB SHADOW E&M-EST. PATIENT-LVL III: CPT | Mod: PBBFAC,,, | Performed by: STUDENT IN AN ORGANIZED HEALTH CARE EDUCATION/TRAINING PROGRAM

## 2025-08-13 RX ORDER — LORAZEPAM 1 MG/1
1 TABLET ORAL EVERY 12 HOURS PRN
Qty: 30 TABLET | Refills: 0 | Status: SHIPPED | OUTPATIENT
Start: 2025-08-13 | End: 2025-10-12

## 2025-08-21 ENCOUNTER — PATIENT MESSAGE (OUTPATIENT)
Dept: GASTROENTEROLOGY | Facility: CLINIC | Age: 45
End: 2025-08-21
Payer: MEDICAID

## (undated) DEVICE — SOL NORMAL USPCA 0.9%

## (undated) DEVICE — COVER TIP CURVED SCISSORS XI

## (undated) DEVICE — COVER LIGHT HANDLE 80/CA

## (undated) DEVICE — GLOVE SIGNATURE ESSNTL LTX 7

## (undated) DEVICE — IRRIGATOR ENDOSCOPY DISP.

## (undated) DEVICE — TRAY CATH 1-LYR URIMTR 16FR

## (undated) DEVICE — CATH UROLOGICAL 18FR RED

## (undated) DEVICE — DRAPE UINDERBUT GRAD PCH

## (undated) DEVICE — SYR 50CC LL

## (undated) DEVICE — GOWN POLY REINF BRTH SLV XL

## (undated) DEVICE — ELECTRODE REM PLYHSV RETURN 9

## (undated) DEVICE — SUT MONOCRYL 4-0 PS-1 UND

## (undated) DEVICE — DRAPE ARM DAVINCI XI

## (undated) DEVICE — MANIFOLD 4 PORT

## (undated) DEVICE — TOWEL OR DISP STRL BLUE 4/PK

## (undated) DEVICE — HEADREST ROUND DISP FOAM 9IN

## (undated) DEVICE — APPLICATOR CHLORAPREP ORN 26ML

## (undated) DEVICE — CONTAINER SPECIMEN OR STER 4OZ

## (undated) DEVICE — TIP RUMI BLUE DISPOSABLE 5/BX

## (undated) DEVICE — TUBING MEDI-VAC 20FT .25IN

## (undated) DEVICE — DRAPE LAVH SURG 109X109X100IN

## (undated) DEVICE — SEAL CANN UNIVERSAL 5-12MM

## (undated) DEVICE — JELLY SURGILUBE LUBE PKT 3GM

## (undated) DEVICE — NDL PNEUMO INSUFFLATI 120MM

## (undated) DEVICE — SOL ELECTROLUBE ANTI-STIC

## (undated) DEVICE — TRAY SKIN SCRUB WET 4 COMPART

## (undated) DEVICE — DRAPE STERI LONG

## (undated) DEVICE — PACK BASIC SETUP SC BR

## (undated) DEVICE — SUT VICRYL PLUS 0 CT1 36IN

## (undated) DEVICE — OCCLUDER COLPO-PNEUMO STERILE

## (undated) DEVICE — Device

## (undated) DEVICE — DRAPE COLUMN DAVINCI XI

## (undated) DEVICE — COVER PROXIMA MAYO STAND

## (undated) DEVICE — ADHESIVE DERMABOND ADVANCED

## (undated) DEVICE — PACK SURG LITHOTOMY 3 SIRUS

## (undated) DEVICE — TROCAR ENDOPATH XCEL 5X100MM

## (undated) DEVICE — SYR 3CC LUER LOC

## (undated) DEVICE — OBTURATOR BLADELESS 8MM XI CLR

## (undated) DEVICE — SET CYSTO IRR DRP CHMBR 84IN

## (undated) DEVICE — SET PNEUMOCLEAR HEAT HUM SE HF

## (undated) DEVICE — SYR 10CC LUER LOCK

## (undated) DEVICE — KIT ANTIFOG W/SPONG & FLUID

## (undated) DEVICE — SUT V-LOC 90 GS22 2-0 VIO 23CM

## (undated) DEVICE — PAD ABDOMINAL STERILE 8X10IN

## (undated) DEVICE — SOL NACL IRR 1000ML BTL

## (undated) DEVICE — DRESSING TELFA N ADH 3X8

## (undated) DEVICE — GLOVE SENSICARE PI GRN 7